# Patient Record
Sex: FEMALE | Race: WHITE | NOT HISPANIC OR LATINO | Employment: FULL TIME | URBAN - METROPOLITAN AREA
[De-identification: names, ages, dates, MRNs, and addresses within clinical notes are randomized per-mention and may not be internally consistent; named-entity substitution may affect disease eponyms.]

---

## 2023-09-29 ENCOUNTER — APPOINTMENT (OUTPATIENT)
Dept: PRIMARY CARE | Facility: CLINIC | Age: 48
End: 2023-09-29
Payer: COMMERCIAL

## 2023-10-05 ENCOUNTER — TELEMEDICINE (OUTPATIENT)
Dept: PRIMARY CARE | Facility: CLINIC | Age: 48
End: 2023-10-05
Payer: COMMERCIAL

## 2023-10-05 DIAGNOSIS — E88.810 ABDOMINAL OBESITY AND METABOLIC SYNDROME: ICD-10-CM

## 2023-10-05 DIAGNOSIS — K21.9 GASTROESOPHAGEAL REFLUX DISEASE, UNSPECIFIED WHETHER ESOPHAGITIS PRESENT: ICD-10-CM

## 2023-10-05 DIAGNOSIS — I10 HYPERTENSION, UNSPECIFIED TYPE: ICD-10-CM

## 2023-10-05 DIAGNOSIS — M54.50 LOW BACK PAIN, UNSPECIFIED BACK PAIN LATERALITY, UNSPECIFIED CHRONICITY, UNSPECIFIED WHETHER SCIATICA PRESENT: ICD-10-CM

## 2023-10-05 DIAGNOSIS — E66.9 ABDOMINAL OBESITY AND METABOLIC SYNDROME: ICD-10-CM

## 2023-10-05 DIAGNOSIS — M79.7 FIBROMYALGIA: ICD-10-CM

## 2023-10-05 DIAGNOSIS — Z00.00 HEALTHCARE MAINTENANCE: ICD-10-CM

## 2023-10-05 DIAGNOSIS — F32.A DEPRESSION, UNSPECIFIED DEPRESSION TYPE: ICD-10-CM

## 2023-10-05 DIAGNOSIS — E78.5 HYPERLIPIDEMIA, UNSPECIFIED HYPERLIPIDEMIA TYPE: ICD-10-CM

## 2023-10-05 DIAGNOSIS — B00.9 HERPES: Primary | ICD-10-CM

## 2023-10-05 PROBLEM — L30.9 ECZEMA: Status: ACTIVE | Noted: 2023-10-05

## 2023-10-05 PROBLEM — H52.203 MYOPIA OF BOTH EYES WITH ASTIGMATISM AND PRESBYOPIA: Status: ACTIVE | Noted: 2023-10-05

## 2023-10-05 PROBLEM — E66.01 OBESITY, MORBID (MORE THAN 100 LBS OVER IDEAL WEIGHT OR BMI > 40) (MULTI): Status: ACTIVE | Noted: 2023-10-05

## 2023-10-05 PROBLEM — G47.19 EXCESSIVE DAYTIME SLEEPINESS: Status: ACTIVE | Noted: 2023-10-05

## 2023-10-05 PROBLEM — E88.819 INSULIN RESISTANCE: Status: ACTIVE | Noted: 2023-10-05

## 2023-10-05 PROBLEM — L24.A9 WOUND DRAINAGE: Status: RESOLVED | Noted: 2023-10-05 | Resolved: 2023-10-05

## 2023-10-05 PROBLEM — R74.8 ELEVATED ALKALINE PHOSPHATASE LEVEL: Status: ACTIVE | Noted: 2023-10-05

## 2023-10-05 PROBLEM — M48.07 STENOSIS OF LUMBOSACRAL SPINE: Status: ACTIVE | Noted: 2023-10-05

## 2023-10-05 PROBLEM — S73.199A ACETABULAR LABRUM TEAR: Status: RESOLVED | Noted: 2023-10-05 | Resolved: 2023-10-05

## 2023-10-05 PROBLEM — H16.223 KERATOCONJUNCTIVITIS SICCA OF BOTH EYES NOT DUE TO SJOGREN'S SYNDROME: Status: ACTIVE | Noted: 2023-10-05

## 2023-10-05 PROBLEM — E55.9 VITAMIN D DEFICIENCY: Status: ACTIVE | Noted: 2023-10-05

## 2023-10-05 PROBLEM — R92.8 ABNORMAL MAMMOGRAM: Status: ACTIVE | Noted: 2023-10-05

## 2023-10-05 PROBLEM — S43.431A SLAP LESION OF RIGHT SHOULDER: Status: ACTIVE | Noted: 2023-10-05

## 2023-10-05 PROBLEM — G47.33 OBSTRUCTIVE SLEEP APNEA SYNDROME IN ADULT: Status: ACTIVE | Noted: 2023-10-05

## 2023-10-05 PROBLEM — G47.11 IDIOPATHIC HYPERSOMNOLENCE: Status: ACTIVE | Noted: 2023-10-05

## 2023-10-05 PROBLEM — M25.561 RIGHT KNEE PAIN: Status: ACTIVE | Noted: 2023-10-05

## 2023-10-05 PROBLEM — R63.2 POLYPHAGIA: Status: ACTIVE | Noted: 2023-10-05

## 2023-10-05 PROBLEM — M43.10 ACQUIRED SPONDYLOLISTHESIS: Status: ACTIVE | Noted: 2023-10-05

## 2023-10-05 PROBLEM — G47.10 HYPERSOMNOLENCE: Status: ACTIVE | Noted: 2023-10-05

## 2023-10-05 PROBLEM — N80.9 ENDOMETRIOSIS: Status: ACTIVE | Noted: 2023-10-05

## 2023-10-05 PROBLEM — M65.4 DE QUERVAIN'S TENOSYNOVITIS, RIGHT: Status: ACTIVE | Noted: 2023-10-05

## 2023-10-05 PROBLEM — M25.50 JOINT PAIN: Status: ACTIVE | Noted: 2023-10-05

## 2023-10-05 PROBLEM — N95.1 MENOPAUSAL SYMPTOMS: Status: ACTIVE | Noted: 2023-10-05

## 2023-10-05 PROBLEM — R10.2 PELVIC PAIN IN FEMALE: Status: ACTIVE | Noted: 2023-10-05

## 2023-10-05 PROBLEM — M25.531 RIGHT WRIST PAIN: Status: ACTIVE | Noted: 2023-10-05

## 2023-10-05 PROBLEM — R06.00 DYSPNEA, UNSPECIFIED: Status: ACTIVE | Noted: 2023-10-05

## 2023-10-05 PROBLEM — D68.52 PROTHROMBIN GENE MUTATION (MULTI): Status: RESOLVED | Noted: 2023-10-05 | Resolved: 2023-10-05

## 2023-10-05 PROBLEM — R00.0 TACHYCARDIA: Status: ACTIVE | Noted: 2023-10-05

## 2023-10-05 PROBLEM — R00.2 PALPITATIONS: Status: ACTIVE | Noted: 2023-10-05

## 2023-10-05 PROBLEM — L02.92 RECURRENT BOILS: Status: ACTIVE | Noted: 2023-10-05

## 2023-10-05 PROBLEM — M47.817 SPONDYLOSIS WITHOUT MYELOPATHY OR RADICULOPATHY, LUMBOSACRAL REGION: Status: ACTIVE | Noted: 2023-10-05

## 2023-10-05 PROBLEM — R73.03 PREDIABETES: Status: ACTIVE | Noted: 2023-10-05

## 2023-10-05 PROBLEM — H52.13 MYOPIA OF BOTH EYES WITH ASTIGMATISM AND PRESBYOPIA: Status: ACTIVE | Noted: 2023-10-05

## 2023-10-05 PROBLEM — B00.1 RECURRENT COLD SORES: Status: ACTIVE | Noted: 2023-10-05

## 2023-10-05 PROBLEM — R53.83 FATIGUE: Status: ACTIVE | Noted: 2023-10-05

## 2023-10-05 PROBLEM — H52.4 MYOPIA OF BOTH EYES WITH ASTIGMATISM AND PRESBYOPIA: Status: ACTIVE | Noted: 2023-10-05

## 2023-10-05 PROBLEM — D68.52 PROTHROMBIN GENE MUTATION (MULTI): Status: ACTIVE | Noted: 2023-10-05

## 2023-10-05 PROCEDURE — 99214 OFFICE O/P EST MOD 30 MIN: CPT | Performed by: STUDENT IN AN ORGANIZED HEALTH CARE EDUCATION/TRAINING PROGRAM

## 2023-10-05 RX ORDER — OMEPRAZOLE 20 MG/1
CAPSULE, DELAYED RELEASE ORAL
COMMUNITY
End: 2023-12-05 | Stop reason: SDUPTHER

## 2023-10-05 RX ORDER — CELECOXIB 200 MG/1
200 CAPSULE ORAL 2 TIMES DAILY
Qty: 60 CAPSULE | Refills: 5 | Status: SHIPPED | OUTPATIENT
Start: 2023-10-05 | End: 2024-04-05 | Stop reason: SDUPTHER

## 2023-10-05 RX ORDER — VALACYCLOVIR HYDROCHLORIDE 500 MG/1
500 TABLET, FILM COATED ORAL DAILY
Qty: 90 TABLET | Refills: 1 | Status: SHIPPED | OUTPATIENT
Start: 2023-10-05 | End: 2024-04-05 | Stop reason: SDUPTHER

## 2023-10-05 RX ORDER — HYDROCHLOROTHIAZIDE 12.5 MG/1
1 TABLET ORAL
COMMUNITY
Start: 2021-05-21 | End: 2023-10-05 | Stop reason: ALTCHOICE

## 2023-10-05 RX ORDER — DULOXETIN HYDROCHLORIDE 60 MG/1
60 CAPSULE, DELAYED RELEASE ORAL DAILY
Qty: 90 CAPSULE | Refills: 1 | Status: SHIPPED | OUTPATIENT
Start: 2023-10-05 | End: 2024-04-05 | Stop reason: SDUPTHER

## 2023-10-05 RX ORDER — ROSUVASTATIN CALCIUM 10 MG/1
10 TABLET, COATED ORAL EVERY 24 HOURS
Qty: 90 TABLET | Refills: 1 | Status: SHIPPED | OUTPATIENT
Start: 2023-10-05 | End: 2024-04-05 | Stop reason: SDUPTHER

## 2023-10-05 RX ORDER — METHOCARBAMOL 500 MG/1
TABLET, FILM COATED ORAL
COMMUNITY
Start: 2023-03-08 | End: 2023-11-10 | Stop reason: WASHOUT

## 2023-10-05 RX ORDER — ROSUVASTATIN CALCIUM 10 MG/1
TABLET, COATED ORAL EVERY 24 HOURS
COMMUNITY
Start: 2017-06-13 | End: 2023-10-05 | Stop reason: SDUPTHER

## 2023-10-05 RX ORDER — GABAPENTIN 100 MG/1
1 CAPSULE ORAL
COMMUNITY
Start: 2023-06-06 | End: 2023-12-05 | Stop reason: SDUPTHER

## 2023-10-05 NOTE — PROGRESS NOTES
Subjective   Patient ID: Fani Garcia is a 48 y.o. female who presents for Hyperlipidemia, GERD, Depression, and Endometriosis (/).  Today she is accompanied by alone.     HPI  1. 6-month follow-up/medication refills  Overall she is doing well  Requests a virtual visit due to her schedule and now working at the [facility] [employer] within the [facility]     2. Weight management  Now 171 Ibs, 90+ Ib weight loss  Current weight-loss management plan includes medical therapy of Ozempic, well-tolerated  Follows-up with Dr. Briana Figueroa (Bariatric Surgery)     3. Depression/Fibromyalgia  Well-controlled with current medication regiment of Duloxetine 60 mg oral tabs daily  Denies of any recent depression or generalized pain symptoms     4. GERD  Currently on Omeprazole 20 mg tabs daily  Overall feels stable  Gets refills from her weight loss specialty providers     5. Hyperlipidemia  Currently managed with medication regiment of Rosuvastatin 10 mg oral tabs taken at night  Denies of any recent myalgias or cardiopulmonary symptoms  Willing to get blood work in the near future     6. Hypertension  Managed OFF anti-hypertensive medication regiment  States that, due to the recent weight loss, has discontinued her anti-hypertensive medication regiment  Measures blood-pressure at home with home BP cuff  Denies any cardiopulmonary symptoms     7. Herpes  Manages breakthrough symptoms with medication regiment of Valacyclovir 500 mg oral tabs  Requesting medication refill  Not currently experiencing a herpetic oral flare  Denies of any recent  symptoms     8. Lower back pain  Follows-up with Dr. Bony Cruz (Orthopedic Surgeon), most recently seen last month  She is now status post surgery from earlier this year  Unfortunately some increase signs/symptoms occurred and she did use gabapentin which helped  Otherwise, starting physical therapy in the near future      Current Outpatient Medications on File Prior  to Visit   Medication Sig Dispense Refill    gabapentin (Neurontin) 100 mg capsule 1 capsule (100 mg).      methocarbamol (Robaxin) 500 mg tablet       rosuvastatin (Crestor) 10 mg tablet once every 24 hours.      [DISCONTINUED] hydroCHLOROthiazide (HYDRODiuril) 12.5 mg tablet Take 1 tablet (12.5 mg) by mouth once daily in the morning. Take before meals.      norethindrone (Aygestin) 5 mg tablet TAKE 1 TABLET BY MOUTH ONCE DAILY 90 tablet 3    omeprazole (PriLOSEC) 20 mg DR capsule TAKE 1 CAPSULE BY MOUTH EVERY DAY 30 MINUTES BEFORE MORNING MEAL FOR 90 DAYS      semaglutide 2 mg/dose (8 mg/3 mL) pen injector INJECT 2MG UNDER THE SKIN ONCE WEEKLY 9 mL 3     No current facility-administered medications on file prior to visit.        Allergies   Allergen Reactions    Lisinopril Nausea And Vomiting and Unknown    Meperidine Hives, Rash and Unknown       Immunization History   Administered Date(s) Administered    Flu vaccine (IIV4), preservative free *Check age/dose* 10/28/2016    Moderna SARS-CoV-2 Vaccination 12/31/2020, 02/19/2021, 12/17/2021    Tdap vaccine, age 7 year and older (BOOSTRIX) 07/24/2020         Review of Systems  All pertinent positive symptoms are included in the history of present illness.  All other systems have been reviewed and are negative and noncontributory to this patient's current ailments.     Objective   There were no vitals taken for this visit.  BSA: There is no height or weight on file to calculate BSA.      Physical Exam  CONSTITUTIONAL - well nourished, well developed, looks like stated age, in no acute distress, not ill-appearing, and not tired appearing  SKIN - normal skin color and pigmentation, normal skin turgor without rash, lesions, or nodules visualized  HEAD - no trauma, normocephalic  EYES - normal external exam  CHEST -no distressed breathing, good effort  EXTREMITIES - no edema, no deformities  NEUROLOGICAL - normal balance, normal motor, no ataxia  PSYCHIATRIC - alert,  pleasant and cordial, age-appropriate      Assessment/Plan   1. 6-month follow-up/medication refills  Patient is doing well overall and her medications are well-tolerated  Fasting lab work ordered to be done at her earliest convenience     2. Weight management  I am pleased to hear of your successful weight-loss, currently 171 Ibs, 90+ Ib weight loss  Please continue your current medication regiment as it is well-tolerated and performing appropriately  Continue close follow-up with Dr. Briana Figueroa (Bariatric Surgery) and the weight-loss clinic.     3. Depression/Fibromyalgia  Please continue Duloxetine 60 mg oral tabs daily  No changes recommended at this time   Stable     4. GERD  Continue Omeprazole 20 mg tabs daily for symptom management.     5. Hyperlipidemia  Continue Rosuvastatin 10 mg oral tabs  Lab work ordered so please do this at your earliest convenience     6. Hypertension  We discussed your personal decision to discontinue your current anti-hypertensive medication regiment.  Informed of recent normotensive home blood-pressure measurements associated with recent weight-loss  Please continue measuring your blood-pressures at home  Blood-pressure goal of 130/80, ideally 120/80     7. Herpes  Manages breakthrough symptoms with medication regiment of Valacyclovir 500 mg oral tabs  Requesting medication refill  Not currently experiencing a herpetic oral flare  Denies of any recent  symptoms     8. Lower back pain  Follows-up with Dr. Bony Cruz (Orthopedic Surgeon)  Please continue following up with him per his protocol     Please follow-up for your yearly physical examination in 6 months for continued care or on an as-needed basis

## 2023-10-18 RX ORDER — SODIUM FLUORIDE 6 MG/ML
PASTE, DENTIFRICE DENTAL
COMMUNITY

## 2023-10-18 RX ORDER — CHOLECALCIFEROL (VITAMIN D3) 50 MCG
50 TABLET ORAL DAILY
COMMUNITY

## 2023-10-18 RX ORDER — PHENTERMINE AND TOPIRAMATE 11.25; 69 MG/1; MG/1
1 CAPSULE, EXTENDED RELEASE ORAL DAILY
COMMUNITY
End: 2023-12-05 | Stop reason: ALTCHOICE

## 2023-10-19 ENCOUNTER — CLINICAL SUPPORT (OUTPATIENT)
Dept: PHYSICAL THERAPY | Facility: CLINIC | Age: 48
End: 2023-10-19
Payer: COMMERCIAL

## 2023-10-19 ASSESSMENT — ENCOUNTER SYMPTOMS
LOSS OF SENSATION IN FEET: 0
OCCASIONAL FEELINGS OF UNSTEADINESS: 0
DEPRESSION: 0

## 2023-10-20 ENCOUNTER — APPOINTMENT (OUTPATIENT)
Dept: SLEEP MEDICINE | Facility: CLINIC | Age: 48
End: 2023-10-20
Payer: COMMERCIAL

## 2023-11-02 ENCOUNTER — APPOINTMENT (OUTPATIENT)
Dept: PHYSICAL THERAPY | Facility: CLINIC | Age: 48
End: 2023-11-02
Payer: COMMERCIAL

## 2023-11-10 ENCOUNTER — OFFICE VISIT (OUTPATIENT)
Dept: PRIMARY CARE | Facility: CLINIC | Age: 48
End: 2023-11-10
Payer: COMMERCIAL

## 2023-11-10 ENCOUNTER — EVALUATION (OUTPATIENT)
Dept: PHYSICAL THERAPY | Facility: CLINIC | Age: 48
End: 2023-11-10
Payer: COMMERCIAL

## 2023-11-10 DIAGNOSIS — M54.50 CHRONIC LOW BACK PAIN, UNSPECIFIED BACK PAIN LATERALITY, UNSPECIFIED WHETHER SCIATICA PRESENT: Primary | ICD-10-CM

## 2023-11-10 DIAGNOSIS — Z98.1 S/P LUMBAR FUSION: ICD-10-CM

## 2023-11-10 DIAGNOSIS — Z23 ENCOUNTER FOR IMMUNIZATION: Primary | ICD-10-CM

## 2023-11-10 DIAGNOSIS — G89.29 CHRONIC LOW BACK PAIN, UNSPECIFIED BACK PAIN LATERALITY, UNSPECIFIED WHETHER SCIATICA PRESENT: Primary | ICD-10-CM

## 2023-11-10 PROCEDURE — 3008F BODY MASS INDEX DOCD: CPT | Performed by: STUDENT IN AN ORGANIZED HEALTH CARE EDUCATION/TRAINING PROGRAM

## 2023-11-10 PROCEDURE — 97110 THERAPEUTIC EXERCISES: CPT | Mod: GP | Performed by: PHYSICAL THERAPIST

## 2023-11-10 PROCEDURE — 90471 IMMUNIZATION ADMIN: CPT | Performed by: STUDENT IN AN ORGANIZED HEALTH CARE EDUCATION/TRAINING PROGRAM

## 2023-11-10 PROCEDURE — 1036F TOBACCO NON-USER: CPT | Performed by: STUDENT IN AN ORGANIZED HEALTH CARE EDUCATION/TRAINING PROGRAM

## 2023-11-10 PROCEDURE — 97161 PT EVAL LOW COMPLEX 20 MIN: CPT | Mod: GP | Performed by: PHYSICAL THERAPIST

## 2023-11-10 PROCEDURE — 90686 IIV4 VACC NO PRSV 0.5 ML IM: CPT | Performed by: STUDENT IN AN ORGANIZED HEALTH CARE EDUCATION/TRAINING PROGRAM

## 2023-11-10 ASSESSMENT — ENCOUNTER SYMPTOMS
DEPRESSION: 0
OCCASIONAL FEELINGS OF UNSTEADINESS: 0
LOSS OF SENSATION IN FEET: 0

## 2023-11-10 NOTE — PROGRESS NOTES
"Physical Therapy Evaluation    Patient Name: Fani Garcia  MRN: [id number]  Today's Date: 11/10/2023  Visit: 1  Referred by:  Dr. Cruz/Nehal  Diagnosis:   1. Chronic low back pain, unspecified back pain laterality, unspecified whether sciatica present        2. S/P lumbar fusion          SUBJECTIVE:  48 y.o. english speaking male s/p L4-5 fusion 2/21/23 by Dr. Cruz.  She reports ~6 months of being pain free and then in July the symptoms started again insidiously.   Worse with prolonged standing and walking  Better: lying down, sitting   She reports that her back is very stiff in AM and she can't straighten up fully.  Pain located sacral 0-8/10.  Also numbness of sole of left foot.  This symptom was present prior to surgery.    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  OBJECTIVE:  Increased tension in left SLR with 60* ROM compared to R) 70.  (-) CONRAD bilaterally.  5/5 LE myotome strength  4/5 L) hip ABD and EXT  Lower abdominal strength 3+/5  ROM of LB is full except for mild limit with extension.  End range pain and pressure with extension, better with reps.  All other movements full with end range pull same with reps.  (-) Standing flexion test, (-) stork test  (-) SIJ provocation tests except for L) thigh thrust.  1/7.  Outcome Measure:  Oswestry- 38%    ASSESSMENT:  Pt. s/p laminectomy and fusion L4-5 with return of symptoms.  She has some core muscle weakness, pain, and limited functional tolerances secondary to pain.  She requires PT to address these issues to improve her functional tolerances.    TREATMENT:  - Therex:  Supine LE neural flossing 2x10  Prone on elbows x 2 mins.  Seated trunk flexion x 60\"  HL Tband hip ABD 5\" x 10 x 2  Prone over pillow Tband hip EXT G x 10 ea.  Abdominal brace with ball squeeze 10\" x10  Dead bug #1 x 5 x 2    PATIENT EDUCATION: HEP    PLAN:   Daily HEP  Weekly PT x 8 weeks  Rehab potential: good  Plan of care agreement: " Y    GOALS:  Active       PT Problem       Pt. will have improved score on Oswestry by at least 15%       Start:  11/10/23    Expected End:  01/12/24            Pt. will have improved core and LE strength to assist with improving functional tolerances       Start:  11/10/23    Expected End:  01/12/24            Pt. will be independent with HEP       Start:  11/10/23    Expected End:  01/12/24            Pt. will have decreased reports of pain by at least 2 levels using a VAS       Start:  11/10/23    Expected End:  01/12/24

## 2023-11-10 NOTE — Clinical Note
November 10, 2023     Patient: Fani Garcia   YOB: 1975   Date of Visit: 11/10/2023       To Whom It May Concern:    It is my medical opinion that Fani Garcia {Work release (duty restriction):38539}.    If you have any questions or concerns, please don't hesitate to call.         Sincerely,        Corey Olvera, PT    CC: No Recipients

## 2023-11-10 NOTE — Clinical Note
November 10, 2023     Patient: Fani Garcia   YOB: 1975   Date of Visit: 11/10/2023       To Whom it May Concern:    Fani Garcia was seen in my clinic on 11/10/2023. She {Return to school/sport:12279}.    If you have any questions or concerns, please don't hesitate to call.         Sincerely,          Corey Olvera, PT        CC: No Recipients

## 2023-11-14 ENCOUNTER — ANCILLARY PROCEDURE (OUTPATIENT)
Dept: RADIOLOGY | Facility: CLINIC | Age: 48
End: 2023-11-14
Payer: COMMERCIAL

## 2023-11-14 ENCOUNTER — OFFICE VISIT (OUTPATIENT)
Dept: PAIN MEDICINE | Facility: CLINIC | Age: 48
End: 2023-11-14
Payer: COMMERCIAL

## 2023-11-14 VITALS
SYSTOLIC BLOOD PRESSURE: 126 MMHG | WEIGHT: 171 LBS | DIASTOLIC BLOOD PRESSURE: 76 MMHG | HEIGHT: 64 IN | BODY MASS INDEX: 29.19 KG/M2 | RESPIRATION RATE: 16 BRPM

## 2023-11-14 DIAGNOSIS — M54.16 LUMBAR RADICULOPATHY: Primary | ICD-10-CM

## 2023-11-14 DIAGNOSIS — Z98.1 S/P LUMBAR FUSION: ICD-10-CM

## 2023-11-14 DIAGNOSIS — M54.16 LUMBAR RADICULOPATHY: ICD-10-CM

## 2023-11-14 DIAGNOSIS — M70.61 TROCHANTERIC BURSITIS OF RIGHT HIP: ICD-10-CM

## 2023-11-14 PROCEDURE — 3008F BODY MASS INDEX DOCD: CPT | Performed by: ANESTHESIOLOGY

## 2023-11-14 PROCEDURE — 2500000004 HC RX 250 GENERAL PHARMACY W/ HCPCS (ALT 636 FOR OP/ED): Performed by: ANESTHESIOLOGY

## 2023-11-14 PROCEDURE — 20610 DRAIN/INJ JOINT/BURSA W/O US: CPT | Performed by: ANESTHESIOLOGY

## 2023-11-14 PROCEDURE — 2500000005 HC RX 250 GENERAL PHARMACY W/O HCPCS: Performed by: ANESTHESIOLOGY

## 2023-11-14 PROCEDURE — 99214 OFFICE O/P EST MOD 30 MIN: CPT | Performed by: ANESTHESIOLOGY

## 2023-11-14 PROCEDURE — 72120 X-RAY BEND ONLY L-S SPINE: CPT | Mod: FY

## 2023-11-14 PROCEDURE — 1036F TOBACCO NON-USER: CPT | Performed by: ANESTHESIOLOGY

## 2023-11-14 RX ORDER — TRIAMCINOLONE ACETONIDE 40 MG/ML
40 INJECTION, SUSPENSION INTRA-ARTICULAR; INTRAMUSCULAR ONCE
Status: COMPLETED | OUTPATIENT
Start: 2023-11-14 | End: 2023-11-14

## 2023-11-14 RX ORDER — LIDOCAINE HYDROCHLORIDE 10 MG/ML
4 INJECTION, SOLUTION EPIDURAL; INFILTRATION; INTRACAUDAL; PERINEURAL ONCE
Status: COMPLETED | OUTPATIENT
Start: 2023-11-14 | End: 2023-11-14

## 2023-11-14 RX ADMIN — TRIAMCINOLONE ACETONIDE 40 MG: 40 INJECTION, SUSPENSION INTRA-ARTICULAR; INTRAMUSCULAR at 16:30

## 2023-11-14 RX ADMIN — LIDOCAINE HYDROCHLORIDE 40 MG: 10 INJECTION, SOLUTION EPIDURAL; INFILTRATION; INTRACAUDAL; PERINEURAL at 16:31

## 2023-11-14 ASSESSMENT — ENCOUNTER SYMPTOMS
HEMATOLOGIC/LYMPHATIC NEGATIVE: 1
CARDIOVASCULAR NEGATIVE: 1
GASTROINTESTINAL NEGATIVE: 1
CONSTITUTIONAL NEGATIVE: 1
PSYCHIATRIC NEGATIVE: 1
ENDOCRINE NEGATIVE: 1
RESPIRATORY NEGATIVE: 1
BACK PAIN: 1
EYES NEGATIVE: 1
ALLERGIC/IMMUNOLOGIC NEGATIVE: 1

## 2023-11-14 ASSESSMENT — PATIENT HEALTH QUESTIONNAIRE - PHQ9
2. FEELING DOWN, DEPRESSED OR HOPELESS: NOT AT ALL
1. LITTLE INTEREST OR PLEASURE IN DOING THINGS: NOT AT ALL
SUM OF ALL RESPONSES TO PHQ9 QUESTIONS 1 AND 2: 0

## 2023-11-14 ASSESSMENT — PAIN - FUNCTIONAL ASSESSMENT: PAIN_FUNCTIONAL_ASSESSMENT: 0-10

## 2023-11-14 ASSESSMENT — LIFESTYLE VARIABLES: TOTAL SCORE: 6

## 2023-11-14 ASSESSMENT — PAIN DESCRIPTION - DESCRIPTORS: DESCRIPTORS: NUMBNESS;TINGLING

## 2023-11-14 ASSESSMENT — PAIN SCALES - GENERAL
PAINLEVEL: 8
PAINLEVEL_OUTOF10: 8

## 2023-11-14 NOTE — PROGRESS NOTES
Subjective   Patient ID: Fani Garcia is a 48 y.o. female who presents for Back Pain and right hip pain.  Back Pain  Associated symptoms include numbness.     Patient here today for follow up today.  She had L4/5 fusion with Dr. Cruz 2/2023.  She has had a 90 lb weight loss over the last 12 months but 15 pounds since surgery.  She was doing great until July.  She had no trauma.  She started having back pain first.  The pain then started radiating down to the left leg to the top of her foot in the same manner as before her surgery.  Standing is the worse position for her.  She is unable to stand for longer than 10 min and this is how she was before.  She has to lean forward to wash dishes and she has a hard sheree lifting.  She was sent back to PT and started on Friday and has had some elevated pain.  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  She is working on core and glute strengthening.      The patient reports that she has been having right greater trochanter bursitis.  She knows that since has been in physical therapy and trying to walk more and her gait being off because of her left radicular leg pain she has been getting point tenderness over the right greater trochanter.  It bothers her when she rolls over at night and will be painful for her she gets it with walking as well and sitting and crossing her legs.  She would like an injection today.  Review of Systems   Constitutional: Negative.    HENT: Negative.     Eyes: Negative.    Respiratory: Negative.     Cardiovascular: Negative.    Gastrointestinal: Negative.    Endocrine: Negative.    Genitourinary: Negative.    Musculoskeletal:  Positive for arthralgias, back pain and myalgias.   Skin: Negative.    Allergic/Immunologic: Negative.    Neurological:  Positive for numbness.   Hematological: Negative.    Psychiatric/Behavioral: Negative.         Objective   Physical Exam  Vitals and nursing note reviewed.   Constitutional:       Appearance: Normal  appearance.   HENT:      Head: Normocephalic and atraumatic.      Right Ear: Ear canal and external ear normal.      Left Ear: Ear canal and external ear normal.      Nose: Nose normal.      Mouth/Throat:      Mouth: Mucous membranes are moist.      Pharynx: Oropharynx is clear.   Eyes:      Conjunctiva/sclera: Conjunctivae normal.      Pupils: Pupils are equal, round, and reactive to light.   Cardiovascular:      Rate and Rhythm: Normal rate.   Pulmonary:      Effort: Pulmonary effort is normal. No respiratory distress.   Musculoskeletal:      Cervical back: Normal range of motion and neck supple.      Lumbar back: Tenderness present. Decreased range of motion. Negative right straight leg raise test and negative left straight leg raise test.        Back:       Right hip: Tenderness (right GT) present.        Legs:    Skin:     General: Skin is warm and dry.   Neurological:      Mental Status: She is alert and oriented to person, place, and time.      Motor: Motor function is intact.      Coordination: Coordination is intact.      Gait: Gait is intact.      Deep Tendon Reflexes:      Reflex Scores:       Patellar reflexes are 2+ on the right side and 2+ on the left side.  Psychiatric:         Mood and Affect: Mood normal.         Thought Content: Thought content normal.     Joint Injection/Aspiration    Date/Time: 11/16/2023 7:33 AM    Performed by: Leander Pompa MD  Authorized by: Leander Pompa MD    Consent:     Consent obtained:  Written    Consent given by:  Patient    Risks, benefits, and alternatives were discussed: yes      Risks discussed:  Pain    Alternatives discussed:  No treatment  Universal protocol:     Procedure explained and questions answered to patient or proxy's satisfaction: yes      Relevant documents present and verified: yes      Test results available: yes      Imaging studies available: yes      Required blood products, implants, devices, and special equipment available: yes       Site/side marked: yes      Immediately prior to procedure, a time out was called: yes      Patient identity confirmed:  Verbally with patient and provided demographic data  Location:     Location:  Hip    Hip:  R hip joint (Greater Trochanter Bursa)  Procedure details:     Needle gauge: 25 G.    Ultrasound guidance: no      Approach:  Lateral    Steroid injected: yes    Post-procedure details:     Dressing:  Adhesive bandage    Procedure completion:  Tolerated well, no immediate complications  Comments:      PROCEDURE:  Greater Trochanteric Bursa Injection     The patient was identified in the preoperative holding area.  The procedure was discussed in detail including risks, benefits, and alternatives.  The patient wished to proceed.      PROCEDURE DETAILS:  The patient was taken to the procedure room and placed on the procedure table in the right lateral decubitus position.  Following this, the skin was marked and the right hip was prepared with a chloraprep scrub. The area over the right greater trochanter was palpated for the area of maximal tenderness and marked onto the skin. After, a 25 gauge spinal needle was inserted into the skin until the needle tip touched os.  The needle was aspirated for heme, which was negative. Then a mixture of 4 mL of 1% lidocaine and 40mg of triamcinolone was injected into the[] trochanteric bursa, 0 ml was wasted. The needle was removed and bandage applied    Assessment/Plan   Problem List Items Addressed This Visit             ICD-10-CM       Neuro    S/P lumbar fusion Z98.1    Relevant Orders    XR lumbar spine 4+ views w flexion extension    CT lumbar spine wo IV contrast     Other Visit Diagnoses         Codes    Lumbar radiculopathy    -  Primary M54.16    Relevant Orders    XR lumbar spine 4+ views w flexion extension    CT lumbar spine wo IV contrast          I nice discussion with the patient today our plan will be as follows.    Radiology: Patient have new  flexion-extension films of her lumbar spine as well as CT scan.    Physically: Patient should continue in physical therapy.    Psychologically: No issues at this time.    Medication: Nothing at this time.    Duration: 4 months.    Intervention: We will get the patient set up for caudal epidural steroid injection under fluoroscopic guidance to help with acute onset radicular leg pain after lumbar surgery.  Risks, benefit, and alternatives of the procedure were discussed with the patient.  Oswestry score has been compelted and recorded.    Patient have right greater trochanter injection today.  Risks, benefit, and alternatives of the procedure were discussed with the patient.  Oswestry score has been compelted and recorded.

## 2023-11-16 PROCEDURE — 20610 DRAIN/INJ JOINT/BURSA W/O US: CPT | Performed by: ANESTHESIOLOGY

## 2023-11-16 ASSESSMENT — ENCOUNTER SYMPTOMS
ARTHRALGIAS: 1
MYALGIAS: 1
NUMBNESS: 1

## 2023-12-05 ENCOUNTER — TELEMEDICINE (OUTPATIENT)
Dept: PAIN MEDICINE | Facility: CLINIC | Age: 48
End: 2023-12-05
Payer: COMMERCIAL

## 2023-12-05 VITALS — HEIGHT: 64 IN | WEIGHT: 160 LBS | BODY MASS INDEX: 27.31 KG/M2

## 2023-12-05 DIAGNOSIS — E88.819 INSULIN RESISTANCE: ICD-10-CM

## 2023-12-05 DIAGNOSIS — M48.07 STENOSIS OF LUMBOSACRAL SPINE: ICD-10-CM

## 2023-12-05 DIAGNOSIS — E66.9 ABDOMINAL OBESITY AND METABOLIC SYNDROME: ICD-10-CM

## 2023-12-05 DIAGNOSIS — E88.810 ABDOMINAL OBESITY AND METABOLIC SYNDROME: ICD-10-CM

## 2023-12-05 DIAGNOSIS — E66.3 OVERWEIGHT (BMI 25.0-29.9): ICD-10-CM

## 2023-12-05 DIAGNOSIS — Z98.1 S/P LUMBAR FUSION: ICD-10-CM

## 2023-12-05 DIAGNOSIS — K21.9 GASTROESOPHAGEAL REFLUX DISEASE, UNSPECIFIED WHETHER ESOPHAGITIS PRESENT: ICD-10-CM

## 2023-12-05 DIAGNOSIS — R63.2 POLYPHAGIA: ICD-10-CM

## 2023-12-05 DIAGNOSIS — M54.16 LUMBAR RADICULOPATHY: ICD-10-CM

## 2023-12-05 DIAGNOSIS — R73.03 PREDIABETES: Primary | ICD-10-CM

## 2023-12-05 DIAGNOSIS — E88.810 METABOLIC SYNDROME: ICD-10-CM

## 2023-12-05 PROCEDURE — 99214 OFFICE O/P EST MOD 30 MIN: CPT | Performed by: NURSE PRACTITIONER

## 2023-12-05 PROCEDURE — RXMED WILLOW AMBULATORY MEDICATION CHARGE

## 2023-12-05 PROCEDURE — 99401 PREV MED CNSL INDIV APPRX 15: CPT | Performed by: NURSE PRACTITIONER

## 2023-12-05 RX ORDER — OXYCODONE AND ACETAMINOPHEN 5; 325 MG/1; MG/1
1 TABLET ORAL EVERY 6 HOURS PRN
Qty: 20 TABLET | Refills: 0 | Status: SHIPPED | OUTPATIENT
Start: 2023-12-05 | End: 2023-12-08 | Stop reason: ALTCHOICE

## 2023-12-05 RX ORDER — GABAPENTIN 100 MG/1
100 CAPSULE ORAL 3 TIMES DAILY
Qty: 270 CAPSULE | Refills: 2 | Status: SHIPPED | OUTPATIENT
Start: 2023-12-05 | End: 2024-03-04

## 2023-12-05 RX ORDER — PHENTERMINE AND TOPIRAMATE 15; 92 MG/1; MG/1
15-92 CAPSULE, EXTENDED RELEASE ORAL DAILY
Qty: 30 CAPSULE | Refills: 3 | Status: SHIPPED | OUTPATIENT
Start: 2023-12-05 | End: 2024-05-01 | Stop reason: SDUPTHER

## 2023-12-05 RX ORDER — OMEPRAZOLE 20 MG/1
CAPSULE, DELAYED RELEASE ORAL
Qty: 90 CAPSULE | Refills: 1 | Status: SHIPPED | OUTPATIENT
Start: 2023-12-05

## 2023-12-05 ASSESSMENT — PAIN SCALES - GENERAL: PAINLEVEL_OUTOF10: 3

## 2023-12-05 ASSESSMENT — PATIENT HEALTH QUESTIONNAIRE - PHQ9
SUM OF ALL RESPONSES TO PHQ9 QUESTIONS 1 AND 2: 0
2. FEELING DOWN, DEPRESSED OR HOPELESS: NOT AT ALL
1. LITTLE INTEREST OR PLEASURE IN DOING THINGS: NOT AT ALL

## 2023-12-05 ASSESSMENT — PAIN DESCRIPTION - DESCRIPTORS: DESCRIPTORS: ACHING

## 2023-12-05 ASSESSMENT — PAIN - FUNCTIONAL ASSESSMENT: PAIN_FUNCTIONAL_ASSESSMENT: 0-10

## 2023-12-05 NOTE — PROGRESS NOTES
Subjective   Patient ID: Fani Garcia is a 48 y.o. female who presents for Pain Management and Weight Management Follow-ups/Medication Refills.     With the patient's permission, a telehealth visit was conducted utilizing both audio and video telecommunication. The provider, MA and patient participated in this telemedicine encounter.    HPI 47-year-old female with a history of obesity and associated comorbidities of Hypertension, Hyperlipidemia, Joint Pain, Obstructive sleep apnea and Vitamin-D deficiency. Of note, Fani underwent an L4-L5 MAS TLIF with Dr. Federico Cruz on February 21, 2023. Fani had been doing relatively well in her recovery but, unfortunately, she has had a recurrence of the back and radicular leg pain she was having before surgery. In addition, she started having significant right hip pain for which she received an intra-articular joint injection by Dr. Pompa on 11/14/2023. Fani reports her hip pain has significantly improved since her injection. In addition, she is scheduled for a Caudal injection with Dr. Pompa on Friday this week 12/8/2023. She is still using the Gabapentin for the back and radicular leg pain. She is unable to take it TID during the work week as it further increases her daytime somnolence. She also utilizes Percocet very infrequently. She really only takes that when she absolutely has to.     Fani also is being seen today for refills on her anti-obesity medications: Qsymia and Ozempic. She has been doing very well with weight loss; almost down 100lbs since our first visit! She denies nausea vomiting diarrhea constipation or abdominal pain with the use of Ozempic. She denies adverse effects from Qsymia.    Weight Hx:  Initial Weight: 255  Last Visit Weight: 170  Current Weight: 160  Total Loss: 95lbs    Diet Recall:  Breakfast: Almonds, Cheese, Cranberry mix   Lunch: White Chocolate Mocha Coffee  Dinner: Chipotle Veggie Bowl  Snack(s): None    Current exercise routine:  Walking the dog.    Review of Systems Unless noted in the HPI all other systems have been reviewed and are negative for complaint    Objective   Physical Exam Telehealth visit; no PE performed.     Assessment/Plan       TREATMENT PLAN:    BACK/LEG PAIN MANAGEMENT:  I had a nice discussion with the patient today and our plan will be as follows:  Radiology: No new imaging to review at this time.   Physically: Encouraged patient to continue with increased physical activity as able.   Psychologically: No acute psychological needs at this time.    Medication: Needs Gabapentin refilled. Last Oxy RX was from March 2023. Discussed with Dr. Pompa and I will send a 5 day RX for PRN severe pain.   Duration: >1 Year   Intervention: Fani is scheduled with Dr. Pompa for a Caudal injection this Friday (12/8).     2. WEIGHT MANAGEMENT:   47 YO Female with Obesity.  Current BMI: 27  Total Weight Loss: 95lbs  Currently on Ozempic 2mg weekly; denies adverse effects.   Also currently on Qsymia 15-92mg daily; denies adverse effects.   Reinforced good dietary habits.  Recommend increased physical activity as able with a goal of 150-300 minutes per week.   OARRS reviewed.   Risks and benefits discussed.   > 50% of time spent counseling on the importance of following recommended dietary modifications including: role of diet, low calorie and carbohydrate restrictions, limiting fast food and avoiding high sugary beverages.   Also discussed, the role of exercise with an ultimate goal of at least 250-300 minutes a week for weight loss and weight maintenance.   Patient verbalizes understanding.     Follow-up in 2-3 months or sooner, as needed.

## 2023-12-06 ENCOUNTER — ANCILLARY PROCEDURE (OUTPATIENT)
Dept: RADIOLOGY | Facility: CLINIC | Age: 48
End: 2023-12-06
Payer: COMMERCIAL

## 2023-12-06 DIAGNOSIS — Z98.1 S/P LUMBAR FUSION: ICD-10-CM

## 2023-12-06 DIAGNOSIS — M54.16 LUMBAR RADICULOPATHY: ICD-10-CM

## 2023-12-06 PROCEDURE — 72131 CT LUMBAR SPINE W/O DYE: CPT | Performed by: RADIOLOGY

## 2023-12-06 PROCEDURE — 72131 CT LUMBAR SPINE W/O DYE: CPT

## 2023-12-08 ENCOUNTER — PHARMACY VISIT (OUTPATIENT)
Dept: PHARMACY | Facility: CLINIC | Age: 48
End: 2023-12-08
Payer: COMMERCIAL

## 2023-12-08 ENCOUNTER — HOSPITAL ENCOUNTER (OUTPATIENT)
Dept: GASTROENTEROLOGY | Facility: HOSPITAL | Age: 48
Discharge: HOME | End: 2023-12-08
Payer: COMMERCIAL

## 2023-12-08 VITALS
RESPIRATION RATE: 16 BRPM | DIASTOLIC BLOOD PRESSURE: 94 MMHG | TEMPERATURE: 96.8 F | SYSTOLIC BLOOD PRESSURE: 140 MMHG | OXYGEN SATURATION: 100 % | WEIGHT: 160 LBS | BODY MASS INDEX: 27.31 KG/M2 | HEIGHT: 64 IN | HEART RATE: 88 BPM

## 2023-12-08 DIAGNOSIS — M54.16 LUMBAR RADICULOPATHY: ICD-10-CM

## 2023-12-08 PROCEDURE — 62323 NJX INTERLAMINAR LMBR/SAC: CPT | Performed by: ANESTHESIOLOGY

## 2023-12-08 RX ORDER — OXYCODONE HYDROCHLORIDE 5 MG/1
5 TABLET ORAL EVERY 4 HOURS PRN
COMMUNITY
End: 2024-05-01 | Stop reason: WASHOUT

## 2023-12-08 RX ORDER — ASPIRIN 81 MG/1
81 TABLET ORAL DAILY
COMMUNITY

## 2023-12-08 ASSESSMENT — PAIN - FUNCTIONAL ASSESSMENT
PAIN_FUNCTIONAL_ASSESSMENT: 0-10
PAIN_FUNCTIONAL_ASSESSMENT: 0-10

## 2023-12-08 ASSESSMENT — PAIN SCALES - GENERAL
PAINLEVEL_OUTOF10: 0 - NO PAIN
PAINLEVEL_OUTOF10: 4

## 2023-12-08 ASSESSMENT — COLUMBIA-SUICIDE SEVERITY RATING SCALE - C-SSRS
1. IN THE PAST MONTH, HAVE YOU WISHED YOU WERE DEAD OR WISHED YOU COULD GO TO SLEEP AND NOT WAKE UP?: NO
6. HAVE YOU EVER DONE ANYTHING, STARTED TO DO ANYTHING, OR PREPARED TO DO ANYTHING TO END YOUR LIFE?: NO
2. HAVE YOU ACTUALLY HAD ANY THOUGHTS OF KILLING YOURSELF?: NO

## 2023-12-08 ASSESSMENT — PAIN DESCRIPTION - DESCRIPTORS: DESCRIPTORS: ACHING

## 2023-12-08 NOTE — DISCHARGE INSTRUCTIONS
DISCHARGEINSTRUCTIONS FOR INJECTIONS     You underwent Caudal LILY today    Aftermost injections, it is recommended that you relax and limit your activity for the remainder of the day unless you have been told otherwise by your pain physician.  You should not drive a car, operate machinery, or make important legal decisions unless otherwise directed by your pain physician.  You may resume your normal activity, including exercise, tomorrow.      Keep a written pain diary of how much pain relief you experienced following the injection procedure and the length of time of pain relief you experienced pain relief. Following diagnostic injections like medial branch nerve blocks, sacroiliac joint blocks, stellate ganglion injections and other blocks, it is very important you record the specific amount of pain relief you experienced immediately after the injectionand how long it lasted. Your doctor will ask you for this information at your follow up visit.     For all injections, please keep the injection site dry and inspect the site for a couple of days. You may remove the Band-Aid the day of the injection at any time.     Some discomfort, bruising or slight swelling may occur at the injection site. This is not abnormal if it occurs.  If needed you may:    -Take over the counter medication such as Tylenol or Motrin.   -Apply an ice pack for 30 minutes, 2 to 3 times a day for the first 24 hours.     You may shower today; no soaking baths, hot tubs, whirlpools or swimming pools for two days.      If you are given steroids in your injection, it may take 3-5 days for the steroid medication to take effect. You may notice a worsening of your symptoms for 1-2 days after the injection. This is not abnormal.  You may use acetaminophen, ibuprofen, or prescription medication that your doctor may have prescribed for you if you need to do so.     A few common side effects of steroids include facial flushing, sweating, restlessness,  irritability,difficulty sleeping, increase in blood sugar, and increased blood pressure. If you have diabetes, please monitor your blood sugar at least once a day for at least 5 days. If you have poorly controlled high blood pressure, monitoryour blood pressure for at least 2 days and contact your primary care physician if these numbers are unusually high for you.      If you take aspirin or non-steroidal anti-inflammatory drugs (examples are Motrin, Advil, ibuprofen, Naprosyn, Voltaren, Relafen, etc.) you may restart these this evening, but stop taking it 3 days before your next appointment, unless instructed otherwiseby your physician.      You do not need to discontinue non-aspirin-containing pain medications prior to an injection (examples: Celebrex, tramadol, hydrocodone and acetaminophen).      If you take a blood thinning medication (Coumadin, Lovenox, Fragmin,Ticlid, Plavix, Pradaxa, etc.), please discuss this with your primary care physician/cardiologist and your pain physician. These medications MUST be discontinued before you can have an injection safely, without the risk of uncontrolled bleeding. If these medications are not discontinued for an appropriate period of time, you will not be able to receivean injection.      If you are taking Coumadin, please have your INR checked the morning of your procedure and bringthe result to your appointment unless otherwise instructed. If your INR is over 1.2, your injection may need to be rescheduled to avoid uncontrolled bleeding from the needle placement.     Call [facility] at [phone] between 8am-4pm Monday - Friday if you are experiencing the following:    If you received an epidural or spinal injection:    -Headache that doesnot go away with medicine, is worse when sitting or standing up, and is greatly relieved upon lying down.   -Severe pain worse than or different than your baseline pain.   -Chills or fever (101º F or greater).    -Drainage or signs of infection at the injection site     Go directly to the Emergency Department if you are experiencing the following and received an epidural or spinal injection:   -Abrupt weakness or progressive weakness in your legs that starts after you leave the clinic.   -Abrupt severe or worsening numbness in your legs.   -Inability to urinate after the injection or loss of bowel or bladder control without the urge to defecate or urinate.     If you have a clinical question that cannot wait until your next appointment, please call [phone] between 8am-4pm Monday - Friday or send a [employer] message. We do our best to return all non-emergency messages within 24 hours, Monday - Friday. A nurse or physician will return your message.      If you need to cancel an appointment, please call the scheduling staff at [phone] during normal business hours or leave a message at least 24 hours in advance.     If you are going to be sedated for your next procedure, you MUST have responsible adult who can legally drive accompany you home. You cannot eat or drink for eight hours prior to the planned procedure if you are going to receive sedation. You may take your non-blood thinning medications with a small sip of water.

## 2023-12-08 NOTE — OP NOTE
Preprocedure diagnosis: Lumbar radiculopathy  Postprocedure diagnosis Lumbar radiculopathy    Procedure performed: Caudal epidural steroid injection    Physician: Leander Pompa MD    Anesthesia: Local    Complications: none    Blood loss:  none    Clinical note: This is a very pleasant 48-year-old female who suffers with low back and leg pain .here meeting all medical criteria for above-mentioned procedure.    Procedure:  Procedure: Caudal Epidural Steroid Injection    The patient was identified in the preoperative area.  The procedure was discussed in detail including its risks, benefits, and alternatives.  Signed consent was obtained and the patient agreed to proceed.     The patient was brought to the [facility] procedure room and placed in the prone position onto the fluoroscopy table. The lumbosacral area was prepped and draped in the usual sterile fashion using Chloroprep and a fenestrated drape.  Under fluoroscopic guidance in the lateral view the sacrum was imaged and the the intended needle insertion site was marked onto the skin.  The skin was anesthetized with 5ml of 2% lidocaine.  After adequate skin anesthesia was obtained, a 22 gauge spinale needle was inserted through the sacro-coccygeal ligament into the epidural space under fluoroscopic guidance in the AP view.  The needle was aspirated and 1 ml of Omnipaque contrast dye was injected under live fluoroscopy which showed epidural spread.  The needle was then advanced under intermittent fluoroscopic guidance in the AP until the needle tip was between the S2 and S3 neuro-foramen.  The needle was aspirated for heme and csf again, which was negative, and 1 ml of Omnipaque was injected under live fluoroscopy which showed appropriate spread without intrathecal or intravascular uptake.  Then 9 ml of 0.5% preservative free lidocaine and 40 mg of triamcinolone was injected.  The needle was removed and a sterile bandage was applied. The patient was take back to  the recovery area.     The procedure was completed without complications and was tolerated well. The patient was monitored after the procedure. The patient (or responsible party) was given post-procedure and discharge instructions to follow at home. The patient was discharged in stable condition. A follow up appointment was made.

## 2023-12-15 ENCOUNTER — APPOINTMENT (OUTPATIENT)
Dept: SLEEP MEDICINE | Facility: CLINIC | Age: 48
End: 2023-12-15
Payer: COMMERCIAL

## 2023-12-23 ENCOUNTER — TELEPHONE (OUTPATIENT)
Dept: PAIN MEDICINE | Facility: CLINIC | Age: 48
End: 2023-12-23
Payer: COMMERCIAL

## 2023-12-23 DIAGNOSIS — M47.817 SPONDYLOSIS WITHOUT MYELOPATHY OR RADICULOPATHY, LUMBOSACRAL REGION: Primary | ICD-10-CM

## 2023-12-23 RX ORDER — OXYCODONE AND ACETAMINOPHEN 5; 325 MG/1; MG/1
1 TABLET ORAL EVERY 6 HOURS PRN
Qty: 20 TABLET | Refills: 0 | Status: SHIPPED | OUTPATIENT
Start: 2023-12-23 | End: 2023-12-28

## 2023-12-23 NOTE — TELEPHONE ENCOUNTER
Pharmacy claims they never received the RX I sent on 12/5. Resending to new pharmacy as that [facility] gave patient difficult time regarding filling her opioid script. PDMP reviewed.

## 2023-12-26 PROCEDURE — RXMED WILLOW AMBULATORY MEDICATION CHARGE

## 2023-12-27 ENCOUNTER — PHARMACY VISIT (OUTPATIENT)
Dept: PHARMACY | Facility: CLINIC | Age: 48
End: 2023-12-27
Payer: COMMERCIAL

## 2023-12-28 ENCOUNTER — OFFICE VISIT (OUTPATIENT)
Dept: PAIN MEDICINE | Facility: CLINIC | Age: 48
End: 2023-12-28
Payer: COMMERCIAL

## 2023-12-28 ENCOUNTER — LAB (OUTPATIENT)
Dept: LAB | Facility: LAB | Age: 48
End: 2023-12-28
Payer: COMMERCIAL

## 2023-12-28 VITALS
SYSTOLIC BLOOD PRESSURE: 132 MMHG | RESPIRATION RATE: 16 BRPM | BODY MASS INDEX: 26.98 KG/M2 | DIASTOLIC BLOOD PRESSURE: 80 MMHG | WEIGHT: 158 LBS | HEIGHT: 64 IN

## 2023-12-28 DIAGNOSIS — Z00.00 HEALTHCARE MAINTENANCE: ICD-10-CM

## 2023-12-28 DIAGNOSIS — M54.16 LUMBAR RADICULOPATHY: Primary | ICD-10-CM

## 2023-12-28 LAB
ALBUMIN SERPL BCP-MCNC: 3.8 G/DL (ref 3.4–5)
ALP SERPL-CCNC: 70 U/L (ref 33–110)
ALT SERPL W P-5'-P-CCNC: 19 U/L (ref 7–45)
ANION GAP SERPL CALC-SCNC: 10 MMOL/L (ref 10–20)
AST SERPL W P-5'-P-CCNC: 16 U/L (ref 9–39)
BASOPHILS # BLD AUTO: 0.02 X10*3/UL (ref 0–0.1)
BASOPHILS NFR BLD AUTO: 0.3 %
BILIRUB SERPL-MCNC: 1.1 MG/DL (ref 0–1.2)
BUN SERPL-MCNC: 15 MG/DL (ref 6–23)
CALCIUM SERPL-MCNC: 9.2 MG/DL (ref 8.6–10.3)
CHLORIDE SERPL-SCNC: 108 MMOL/L (ref 98–107)
CHOLEST SERPL-MCNC: 118 MG/DL (ref 0–199)
CHOLESTEROL/HDL RATIO: 2.4
CO2 SERPL-SCNC: 24 MMOL/L (ref 21–32)
CREAT SERPL-MCNC: 1.01 MG/DL (ref 0.5–1.05)
EOSINOPHIL # BLD AUTO: 0.04 X10*3/UL (ref 0–0.7)
EOSINOPHIL NFR BLD AUTO: 0.6 %
ERYTHROCYTE [DISTWIDTH] IN BLOOD BY AUTOMATED COUNT: 13.9 % (ref 11.5–14.5)
EST. AVERAGE GLUCOSE BLD GHB EST-MCNC: 103 MG/DL
GFR SERPL CREATININE-BSD FRML MDRD: 68 ML/MIN/1.73M*2
GLUCOSE SERPL-MCNC: 81 MG/DL (ref 74–99)
HBA1C MFR BLD: 5.2 %
HCT VFR BLD AUTO: 46.7 % (ref 36–46)
HDLC SERPL-MCNC: 49.3 MG/DL
HGB BLD-MCNC: 14.8 G/DL (ref 12–16)
IMM GRANULOCYTES # BLD AUTO: 0.03 X10*3/UL (ref 0–0.7)
IMM GRANULOCYTES NFR BLD AUTO: 0.4 % (ref 0–0.9)
LDLC SERPL CALC-MCNC: 60 MG/DL
LYMPHOCYTES # BLD AUTO: 2.01 X10*3/UL (ref 1.2–4.8)
LYMPHOCYTES NFR BLD AUTO: 27.7 %
MCH RBC QN AUTO: 29.4 PG (ref 26–34)
MCHC RBC AUTO-ENTMCNC: 31.7 G/DL (ref 32–36)
MCV RBC AUTO: 93 FL (ref 80–100)
MONOCYTES # BLD AUTO: 0.5 X10*3/UL (ref 0.1–1)
MONOCYTES NFR BLD AUTO: 6.9 %
NEUTROPHILS # BLD AUTO: 4.65 X10*3/UL (ref 1.2–7.7)
NEUTROPHILS NFR BLD AUTO: 64.1 %
NON HDL CHOLESTEROL: 69 MG/DL (ref 0–149)
NRBC BLD-RTO: 0 /100 WBCS (ref 0–0)
PLATELET # BLD AUTO: 342 X10*3/UL (ref 150–450)
POTASSIUM SERPL-SCNC: 4 MMOL/L (ref 3.5–5.3)
PROT SERPL-MCNC: 6.4 G/DL (ref 6.4–8.2)
RBC # BLD AUTO: 5.03 X10*6/UL (ref 4–5.2)
SODIUM SERPL-SCNC: 138 MMOL/L (ref 136–145)
TRIGL SERPL-MCNC: 42 MG/DL (ref 0–149)
TSH SERPL-ACNC: 1.2 MIU/L (ref 0.44–3.98)
VLDL: 8 MG/DL (ref 0–40)
WBC # BLD AUTO: 7.3 X10*3/UL (ref 4.4–11.3)

## 2023-12-28 PROCEDURE — 83036 HEMOGLOBIN GLYCOSYLATED A1C: CPT

## 2023-12-28 PROCEDURE — 80053 COMPREHEN METABOLIC PANEL: CPT

## 2023-12-28 PROCEDURE — 99213 OFFICE O/P EST LOW 20 MIN: CPT | Performed by: ANESTHESIOLOGY

## 2023-12-28 PROCEDURE — 1036F TOBACCO NON-USER: CPT | Performed by: ANESTHESIOLOGY

## 2023-12-28 PROCEDURE — 80061 LIPID PANEL: CPT

## 2023-12-28 PROCEDURE — 84443 ASSAY THYROID STIM HORMONE: CPT

## 2023-12-28 PROCEDURE — 36415 COLL VENOUS BLD VENIPUNCTURE: CPT

## 2023-12-28 PROCEDURE — 85025 COMPLETE CBC W/AUTO DIFF WBC: CPT

## 2023-12-28 PROCEDURE — 3008F BODY MASS INDEX DOCD: CPT | Performed by: ANESTHESIOLOGY

## 2023-12-28 ASSESSMENT — ENCOUNTER SYMPTOMS
MYALGIAS: 1
ALLERGIC/IMMUNOLOGIC NEGATIVE: 1
RESPIRATORY NEGATIVE: 1
EYES NEGATIVE: 1
GASTROINTESTINAL NEGATIVE: 1
BACK PAIN: 1
CONSTITUTIONAL NEGATIVE: 1
NUMBNESS: 1
PSYCHIATRIC NEGATIVE: 1
HEMATOLOGIC/LYMPHATIC NEGATIVE: 1
CARDIOVASCULAR NEGATIVE: 1
ENDOCRINE NEGATIVE: 1
ARTHRALGIAS: 1

## 2023-12-28 ASSESSMENT — PAIN - FUNCTIONAL ASSESSMENT: PAIN_FUNCTIONAL_ASSESSMENT: 0-10

## 2023-12-28 ASSESSMENT — PATIENT HEALTH QUESTIONNAIRE - PHQ9: 2. FEELING DOWN, DEPRESSED OR HOPELESS: NOT AT ALL

## 2023-12-28 ASSESSMENT — PAIN SCALES - GENERAL
PAINLEVEL: 3
PAINLEVEL_OUTOF10: 3

## 2023-12-28 NOTE — PROGRESS NOTES
Subjective   Patient ID: Fani Garcia is a 48 y.o. female who presents for Back Pain.  Back Pain  Associated symptoms include numbness.     Patient today for follow-up from caudal epidural steroid injection.  She reports that she is 90% better.  Pain levels are 1-3 out of 10.  She is able to stand for 15 minutes at a time now.  She is very happy with the outcomes does not feel she needs any additional therapies.  She is not taking any medications at this time.  She reports no numbness, tingling, weakness in the lower extremities.  She is very happy with the outcomes of her procedure.  Review of Systems   Constitutional: Negative.    HENT: Negative.     Eyes: Negative.    Respiratory: Negative.     Cardiovascular: Negative.    Gastrointestinal: Negative.    Endocrine: Negative.    Genitourinary: Negative.    Musculoskeletal:  Positive for arthralgias, back pain and myalgias.   Skin: Negative.    Allergic/Immunologic: Negative.    Neurological:  Positive for numbness.   Hematological: Negative.    Psychiatric/Behavioral: Negative.         Objective   Physical Exam  Vitals and nursing note reviewed.   Constitutional:       Appearance: Normal appearance.   HENT:      Head: Normocephalic and atraumatic.      Right Ear: Ear canal and external ear normal.      Left Ear: Ear canal and external ear normal.      Nose: Nose normal.      Mouth/Throat:      Mouth: Mucous membranes are moist.      Pharynx: Oropharynx is clear.   Eyes:      Conjunctiva/sclera: Conjunctivae normal.      Pupils: Pupils are equal, round, and reactive to light.   Cardiovascular:      Rate and Rhythm: Normal rate.   Pulmonary:      Effort: Pulmonary effort is normal. No respiratory distress.   Musculoskeletal:      Cervical back: Normal range of motion and neck supple.      Lumbar back: Tenderness present. Decreased range of motion. Negative right straight leg raise test and negative left straight leg raise test.        Back:       Right hip:  Tenderness (right GT) present.        Legs:    Skin:     General: Skin is warm and dry.   Neurological:      Mental Status: She is alert and oriented to person, place, and time.      Motor: Motor function is intact.      Coordination: Coordination is intact.      Gait: Gait is intact.      Deep Tendon Reflexes:      Reflex Scores:       Patellar reflexes are 2+ on the right side and 2+ on the left side.  Psychiatric:         Mood and Affect: Mood normal.         Thought Content: Thought content normal.         Assessment/Plan   Problem List Items Addressed This Visit    None  Visit Diagnoses         Codes    Lumbar radiculopathy    -  Primary M54.16        I nice discussion with the patient today our plan will be as follows.    Radiology: All available imaging was reviewed today.    Physically: Continue home exercise program.    Psychologically: No issues at this time.    Medication: Continue OTC medications.    Duration: Greater than 1 year.    Intervention: Patient with a 90% reduction in pain after caudal epidural steroid injection.  Patient doing excellent time with low pain levels.  We will repeat on a as needed basis.           Leander Pompa MD 12/28/23 3:57 PM

## 2023-12-29 NOTE — RESULT ENCOUNTER NOTE
Sugar, kidneys, liver, electrodes are all within normal limits other than chloride being 1 point above normal but overall this has been stable over the past year    Complete blood cell count shows no anemia    Cholesterol one of the best on file at 118, HDL 49, LDL 60, triglycerides 42    Thyroid well within normal limits    Hemoglobin A1c well within normal is at 5.2%    Keep up the great work

## 2024-01-04 ENCOUNTER — HOSPITAL ENCOUNTER (EMERGENCY)
Facility: HOSPITAL | Age: 49
Discharge: HOME | End: 2024-01-04
Payer: COMMERCIAL

## 2024-01-04 ENCOUNTER — APPOINTMENT (OUTPATIENT)
Dept: RADIOLOGY | Facility: HOSPITAL | Age: 49
End: 2024-01-04
Payer: COMMERCIAL

## 2024-01-04 VITALS
OXYGEN SATURATION: 98 % | SYSTOLIC BLOOD PRESSURE: 148 MMHG | HEART RATE: 88 BPM | WEIGHT: 160 LBS | RESPIRATION RATE: 17 BRPM | HEIGHT: 65 IN | DIASTOLIC BLOOD PRESSURE: 88 MMHG | BODY MASS INDEX: 26.66 KG/M2 | TEMPERATURE: 97.8 F

## 2024-01-04 DIAGNOSIS — S00.93XA CONTUSION OF HEAD, UNSPECIFIED PART OF HEAD, INITIAL ENCOUNTER: Primary | ICD-10-CM

## 2024-01-04 PROCEDURE — 73080 X-RAY EXAM OF ELBOW: CPT | Mod: RT

## 2024-01-04 PROCEDURE — 72100 X-RAY EXAM L-S SPINE 2/3 VWS: CPT

## 2024-01-04 PROCEDURE — 72170 X-RAY EXAM OF PELVIS: CPT

## 2024-01-04 PROCEDURE — 70450 CT HEAD/BRAIN W/O DYE: CPT | Performed by: RADIOLOGY

## 2024-01-04 PROCEDURE — 72100 X-RAY EXAM L-S SPINE 2/3 VWS: CPT | Mod: FOREIGN READ | Performed by: RADIOLOGY

## 2024-01-04 PROCEDURE — 73080 X-RAY EXAM OF ELBOW: CPT | Mod: RIGHT SIDE

## 2024-01-04 PROCEDURE — 99284 EMERGENCY DEPT VISIT MOD MDM: CPT

## 2024-01-04 PROCEDURE — 70450 CT HEAD/BRAIN W/O DYE: CPT

## 2024-01-04 ASSESSMENT — COLUMBIA-SUICIDE SEVERITY RATING SCALE - C-SSRS
2. HAVE YOU ACTUALLY HAD ANY THOUGHTS OF KILLING YOURSELF?: NO
1. IN THE PAST MONTH, HAVE YOU WISHED YOU WERE DEAD OR WISHED YOU COULD GO TO SLEEP AND NOT WAKE UP?: NO
6. HAVE YOU EVER DONE ANYTHING, STARTED TO DO ANYTHING, OR PREPARED TO DO ANYTHING TO END YOUR LIFE?: NO

## 2024-01-04 ASSESSMENT — PAIN SCALES - GENERAL: PAINLEVEL_OUTOF10: 8

## 2024-01-04 ASSESSMENT — PAIN - FUNCTIONAL ASSESSMENT: PAIN_FUNCTIONAL_ASSESSMENT: 0-10

## 2024-01-04 NOTE — ED PROVIDER NOTES
HPI   Chief Complaint   Patient presents with    Fall       Well-appearing 48-year-old female no current medical problems chief complaint today of head injury, right elbow injury, low back pain.  Patient states she slipped and fell on the ice prior to arrival.  She states she does have a slight headache.  She denies being on blood thinners with exception of aspirin.  She said she is having no other associated symptoms of nausea vomiting dizziness lightheadedness.    Family history: Reviewed and not pertinent to presenting complaint.  Social history: Non-smoker, nondrinker.    Gen.: No weight loss, fatigue, anorexia, insomnia, fever.  Eyes: No vision loss, double vision, drainage, eye pain.  ENT: No pharyngitis, dry mouth.  Cardiac: No chest pain, palpitations, syncope, near syncope.  Pulmonary: No shortness of breath, cough, hemoptysis.  Heme/lymph: No swollen glands, fever, bleeding.  GI: No abdominal pain, change in bowel habits, melena, hematemesis, hematochezia, nausea, vomiting, diarrhea.  : No discharge, dysuria, frequency, urgency, hematuria.  Musculoskeletal: No limb pain, joint pain, joint swelling.  Skin: No rashes.  Psych: No depression, anxiety, suicidality, homicidality.  Review of systems is otherwise negative unless stated above or in history of present illness.    General: Vitals noted, no distress. Afebrile.  HEENT: Normocephalic atraumatic, PERRLA. No nystagmus, no diplopia, No trismus. TMs clear. Posterior oropharynx unremarkable. No meningismus.  Cardiac: Regular, rate, rhythm, no murmur.  Pulmonary: Lungs clear bilaterally with good aeration. No adventitious breath sounds.  Abdomen: Soft, nonsurgical. Nontender. No peritoneal signs. Normoactive bowel sounds.  Extremities: No peripheral edema. Negative Homans bilaterally, no cords.  Skin: No rash.  Neuro: No focal neurologic deficits, NIH score of 0.    ED course and plan MDM  Well-appearing 48-year-old female no current medical problems chief  complaint today of head injury by falling on ice prior to arrival.  On exam she has mild tenderness to palpation of the right elbow.  Mild tenderness palpation to the lower lumbar spinal region.  X-rays ordered of right elbow, lumbar spine, CT scan head.  All x-rays are unremarkable per radiology read.  Advised patient results.  Prescribed ibuprofen as necessary for pain.  She is agreeable to discharge.  Differential includes subdural hematoma, occult fracture, contusion.                          No data recorded                Patient History   Past Medical History:   Diagnosis Date    Acetabular labrum tear 10/05/2023    Acute pharyngitis, unspecified 03/19/2014    Sorethroat    Acute vaginitis 01/15/2015    Bacterial vaginosis    Benign essential hypertension 10/05/2023    Encounter for gynecological examination (general) (routine) without abnormal findings 12/11/2020    Encounter for gynecological examination    Myopia, unspecified eye 11/11/2016    Myopia with astigmatism and presbyopia    Obesity, unspecified 09/02/2020    Obesity (BMI 30-39.9)    Obstructive sleep apnea (adult) (pediatric) 10/02/2015    Obstructive sleep apnea    Other pulmonary embolism without acute cor pulmonale (CMS/[facility]) 07/20/2013    Pulmonary embolism    Otitis media, unspecified, left ear 03/19/2014    Acute left otitis media    Personal history of other diseases of the circulatory system     History of hypertension    Personal history of other diseases of the digestive system 03/26/2019    History of gastroesophageal reflux (GERD)    Personal history of other diseases of the musculoskeletal system and connective tissue 07/24/2020    History of fibromyalgia    Personal history of other diseases of the musculoskeletal system and connective tissue 01/20/2020    History of muscle weakness    Personal history of other diseases of the nervous system and sense organs 04/28/2014    History of earache    Personal history of other diseases of  the respiratory system 2015    History of influenza    Personal history of other diseases of the respiratory system 2014    History of streptococcal pharyngitis    Personal history of other diseases of the respiratory system 2013    History of acute pharyngitis    Personal history of other endocrine, nutritional and metabolic disease 2021    History of morbid obesity    Personal history of other endocrine, nutritional and metabolic disease 2020    History of morbid obesity    Personal history of other infectious and parasitic diseases     History of herpes simplex infection    Personal history of other specified conditions 2020    History of palpitations    Personal history of other specified conditions 2019    History of dizziness    Personal history of other specified conditions 2020    History of fatigue    Plantar fascial fibromatosis 2015    Plantar fasciitis of left foot    Rash and other nonspecific skin eruption 2020    Rash of neck    Shortness of breath 2020    SOB (shortness of breath) on exertion     Past Surgical History:   Procedure Laterality Date     SECTION, CLASSIC  10/02/2015     Section    DILATION AND CURETTAGE OF UTERUS  2013    Dilation And Curettage    LAPAROSCOPY DIAGNOSTIC / BIOPSY / ASPIRATION / LYSIS  2013    Exploratory Laparoscopy     Family History   Problem Relation Name Age of Onset    Other (Anxiety) Mother      Other (Cardiac disorder) Mother      Depression Mother      Hyperlipidemia Mother      Hypertension Mother      Heart attack Mother      Osteoporosis Mother      ALS Father      Colon cancer Brother      Diabetes Brother      Hyperlipidemia Brother      Hypertension Brother      Diabetes Maternal Grandmother      Ovarian cancer Maternal Grandmother      Diabetes Maternal Grandfather      Liver cancer Maternal Grandfather      Diabetes Paternal Grandmother      Diabetes Paternal  Grandfather       Social History     Tobacco Use    Smoking status: Never    Smokeless tobacco: Never   Substance Use Topics    Alcohol use: Never    Drug use: Never       Physical Exam   ED Triage Vitals [01/04/24 1016]   Temp Heart Rate Resp BP   36.6 °C (97.8 °F) 88 17 148/88      SpO2 Temp src Heart Rate Source Patient Position   98 % -- -- --      BP Location FiO2 (%)     -- --       Physical Exam    ED Course & MDM        Medical Decision Making      Procedure  Procedures     Nolan Whitheead PA-C  01/04/24 1257

## 2024-01-04 NOTE — ED TRIAGE NOTES
C/O RIGHT ELBOW/BACK/HEAD PAIN D/T FALLING ON BLACK ICE TODAY AT 0900, DENIES LOC, -BLOOD THINNERS, DENIES DIZZINESS PRIOR/POST FALL

## 2024-02-16 PROCEDURE — RXMED WILLOW AMBULATORY MEDICATION CHARGE

## 2024-02-20 ENCOUNTER — PHARMACY VISIT (OUTPATIENT)
Dept: PHARMACY | Facility: CLINIC | Age: 49
End: 2024-02-20
Payer: COMMERCIAL

## 2024-03-08 ENCOUNTER — OFFICE VISIT (OUTPATIENT)
Dept: SLEEP MEDICINE | Facility: CLINIC | Age: 49
End: 2024-03-08
Payer: COMMERCIAL

## 2024-03-08 VITALS
DIASTOLIC BLOOD PRESSURE: 82 MMHG | SYSTOLIC BLOOD PRESSURE: 154 MMHG | HEART RATE: 77 BPM | WEIGHT: 165 LBS | TEMPERATURE: 97.5 F | BODY MASS INDEX: 27.83 KG/M2

## 2024-03-08 DIAGNOSIS — F51.12 INSUFFICIENT SLEEP SYNDROME: ICD-10-CM

## 2024-03-08 DIAGNOSIS — G47.33 OSA (OBSTRUCTIVE SLEEP APNEA): ICD-10-CM

## 2024-03-08 DIAGNOSIS — G47.19 EXCESSIVE DAYTIME SLEEPINESS: ICD-10-CM

## 2024-03-08 DIAGNOSIS — G47.10 HYPERSOMNOLENCE: ICD-10-CM

## 2024-03-08 DIAGNOSIS — G47.33 OBSTRUCTIVE SLEEP APNEA SYNDROME IN ADULT: Primary | ICD-10-CM

## 2024-03-08 PROCEDURE — 3008F BODY MASS INDEX DOCD: CPT | Performed by: STUDENT IN AN ORGANIZED HEALTH CARE EDUCATION/TRAINING PROGRAM

## 2024-03-08 PROCEDURE — 1036F TOBACCO NON-USER: CPT | Performed by: STUDENT IN AN ORGANIZED HEALTH CARE EDUCATION/TRAINING PROGRAM

## 2024-03-08 PROCEDURE — 99204 OFFICE O/P NEW MOD 45 MIN: CPT | Performed by: STUDENT IN AN ORGANIZED HEALTH CARE EDUCATION/TRAINING PROGRAM

## 2024-03-08 ASSESSMENT — SLEEP AND FATIGUE QUESTIONNAIRES
HOW LIKELY ARE YOU TO NOD OFF OR FALL ASLEEP WHILE SITTING AND READING: HIGH CHANCE OF DOZING
HOW LIKELY ARE YOU TO NOD OFF OR FALL ASLEEP WHILE SITTING AND TALKING TO SOMEONE: WOULD NEVER DOZE
HOW LIKELY ARE YOU TO NOD OFF OR FALL ASLEEP WHEN YOU ARE A PASSENGER IN A CAR FOR AN HOUR WITHOUT A BREAK: HIGH CHANCE OF DOZING
WORRIED_DISTRESSED_DUE_TO_SLEEP: VERY MUCH NOTICEABLE
HOW LIKELY ARE YOU TO NOD OFF OR FALL ASLEEP WHILE LYING DOWN TO REST IN THE AFTERNOON WHEN CIRCUMSTANCES PERMIT: HIGH CHANCE OF DOZING
SATISFACTION_WITH_CURRENT_SLEEP_PATTERN: DISSATISFIED
SLEEP_PROBLEM_INTERFERES_DAILY_ACTIVITIES: VERY MUCH NOTICEABLE
HOW LIKELY ARE YOU TO NOD OFF OR FALL ASLEEP IN A CAR, WHILE STOPPED FOR A FEW MINUTES IN TRAFFIC: SLIGHT CHANCE OF DOZING
SITING INACTIVE IN A PUBLIC PLACE LIKE A CLASS ROOM OR A MOVIE THEATER: HIGH CHANCE OF DOZING
SLEEP_PROBLEM_NOTICEABLE_TO_OTHERS: VERY MUCH NOTICEABLE
HOW LIKELY ARE YOU TO NOD OFF OR FALL ASLEEP WHILE WATCHING TV: HIGH CHANCE OF DOZING
ESS-CHAD TOTAL SCORE: 19
HOW LIKELY ARE YOU TO NOD OFF OR FALL ASLEEP WHILE SITTING QUIETLY AFTER LUNCH WITHOUT ALCOHOL: HIGH CHANCE OF DOZING

## 2024-03-08 ASSESSMENT — ENCOUNTER SYMPTOMS
CONSTITUTIONAL NEGATIVE: 1
NEUROLOGICAL NEGATIVE: 1
PSYCHIATRIC NEGATIVE: 1
RESPIRATORY NEGATIVE: 1
CARDIOVASCULAR NEGATIVE: 1

## 2024-03-08 NOTE — PROGRESS NOTES
Patient: Fani Garcia    [id number]  : 1975 -- AGE 48 y.o.    Provider: David James MD     Location [facility]   Service Date: 3/8/2024              [facility]  New Visit Note      HISTORY OF PRESENT ILLNESS     The patient's referring provider is: Dang Calvert DO    HISTORY OF PRESENT ILLNESS   Fani Garcia is a 48 y.o. female with h/o JOSE, Depression, and Obesity who presents to a [facility] for a sleep medicine evaluation with concerns of Establish Care.     Past Sleep History  Patient has the following sleep-related diagnoses: obstructive sleep apnea. Prior sleep study results: significant for severe JOSE with AHI ~ 30    Current History    On today's visit, the patient reports that she is feeling sleepy during the day.  She reports that she was diagnosed with JOSE years ago and first saw Dr. Dianna Vital and was started on CPAP therapy.  Then she followed with Dr. Rajput and was last seen in 2019.  She reports that she has been using CPAP very consistently and recently finding some leak with her PAP therapy (WISP mask).  She has also lost about 100# through medication management.  She initially needed her PAP pressure to be higher due to air hunger and was set at 11-20 cwp.  She also reports sleeping about 4-5 hours nightly due to the amount of time it takes for her to finish up her work.  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  She would usually come home and take a 40 min nap.  She also had a PSG/MSLT done with [facility] about 8.5 minute without SOREM (on Cymbalta at the time of testing) and was diagnosed with IH.  She tried Modafinil but felt like it was keeping her awake better but not really giving her the energy she was after.    Sleep schedule:      Weekdays / Work Days Weekends / Days Off   Bedtime 2:30 am  1 AM   Sleep latency 5 min same as weekdays   Wake time 6:45 am  9 AM    Total sleep time  Average/day:  Total sleep time 4-5 hours/day Total sleep time 8-10 hours/day   Naps Yes, for couple times per week for 40min. Naps are refreshing and sometimes she would take 2~3 x 1-2 hour nap on the weekends   Nocturnal awakenings Yes, about 2 times a night     Preferred sleeping position: SLEEP POSITION: sidelying    Sleep-related ROS:    Sleep Initiation: no problems going to sleep  Problems going to sleep associated with: No problems going to sleep    Sleep Maintenance: wakes up about 2 times per night and easily returns to sleep after awakening  Problems staying asleep associated with: Problems Staying Asleep: no clear reason    Breathing during sleep: no symptoms of snoring or concerns of breathing at night with PAP.    RLS screen:  RLSSCREEN: - Sensations: Patient does not have unusual sensations in their extremities that cause an urge to move them     Daytime Symptoms  On awakening patient reports: morning headaches, morning dry mouth, and morning sore throat    Patient report some daytime symptoms including: DAYTIME SYMPTOMS: excessive daytime sleepiness  Patient denies daytime symptoms including: Denies: feeling sleepy when driving  Fatigue: symptoms bothersome, but easily able to carry out all usual work/school/family activities    ESS: 19  JIMMIE: 15  FOSQ: 18      REVIEW OF SYSTEMS     REVIEW OF SYSTEMS  Review of Systems   Constitutional: Negative.    HENT: Negative.     Respiratory: Negative.     Cardiovascular: Negative.    Genitourinary: Negative.    Skin: Negative.    Neurological: Negative.    Psychiatric/Behavioral: Negative.       SLEEP ROS: snoring      ALLERGIES AND MEDICATIONS     ALLERGIES  Allergies   Allergen Reactions    Lisinopril Nausea And Vomiting and Unknown    Meperidine Hives, Rash and Unknown       MEDICATIONS  Current Outpatient Medications   Medication Sig Dispense Refill    aspirin 81 mg EC tablet Take 1 tablet (81 mg) by mouth once daily.      celecoxib  (CeleBREX) 200 mg capsule Take 1 capsule (200 mg) by mouth 2 times a day. 60 capsule 5    cholecalciferol (Vitamin D-3) 50 MCG (2000 UT) tablet Take 1 tablet (50 mcg) by mouth once daily.      DULoxetine (Cymbalta) 60 mg DR capsule Take 1 capsule (60 mg) by mouth once daily. Do not crush or chew. 90 capsule 1    fluoride, sodium, (PreviDent 5000 Booster Plus) 1.1 % dental paste Use As Directed      norethindrone (Aygestin) 5 mg tablet TAKE 1 TABLET BY MOUTH ONCE DAILY 90 tablet 3    omeprazole (PriLOSEC) 20 mg DR capsule TAKE 1 CAPSULE BY MOUTH EVERY DAY 30 MINUTES BEFORE MORNING MEAL FOR 90 DAYS 90 capsule 1    oxyCODONE (Roxicodone) 5 mg immediate release tablet Take 1 tablet (5 mg) by mouth every 4 hours if needed for severe pain (7 - 10).      rosuvastatin (Crestor) 10 mg tablet Take 1 tablet (10 mg) by mouth once every 24 hours. 90 tablet 1    semaglutide 2 mg/dose (8 mg/3 mL) pen injector INJECT 2MG UNDER THE SKIN ONCE WEEKLY 9 mL 2    valACYclovir (Valtrex) 500 mg tablet Take 1 tablet (500 mg) by mouth once daily. 90 tablet 1    gabapentin (Neurontin) 100 mg capsule Take 1 capsule (100 mg) by mouth 3 times a day. 270 capsule 2    phentermine-topiramate (Qsymia) 15-92 mg capsule, ER multiphase 24 hr Take 15-92 mg by mouth once daily. 30 capsule 3     No current facility-administered medications for this visit.         PAST HISTORY     PAST MEDICAL HISTORY  She  has a past medical history of Acetabular labrum tear (10/05/2023), Acute pharyngitis, unspecified (03/19/2014), Acute vaginitis (01/15/2015), Benign essential hypertension (10/05/2023), Encounter for gynecological examination (general) (routine) without abnormal findings (12/11/2020), Myopia, unspecified eye (11/11/2016), Obesity, unspecified (09/02/2020), Obstructive sleep apnea (adult) (pediatric) (10/02/2015), Other pulmonary embolism without acute cor pulmonale (CMS/HCC) (07/20/2013), Otitis media, unspecified, left ear (03/19/2014), Personal  history of other diseases of the circulatory system, Personal history of other diseases of the digestive system (2019), Personal history of other diseases of the musculoskeletal system and connective tissue (2020), Personal history of other diseases of the musculoskeletal system and connective tissue (2020), Personal history of other diseases of the nervous system and sense organs (2014), Personal history of other diseases of the respiratory system (2015), Personal history of other diseases of the respiratory system (2014), Personal history of other diseases of the respiratory system (2013), Personal history of other endocrine, nutritional and metabolic disease (2021), Personal history of other endocrine, nutritional and metabolic disease (2020), Personal history of other infectious and parasitic diseases, Personal history of other specified conditions (2020), Personal history of other specified conditions (2019), Personal history of other specified conditions (2020), Plantar fascial fibromatosis (2015), Rash and other nonspecific skin eruption (2020), and Shortness of breath (2020).    PAST SURGICAL HISTORY:  Past Surgical History:   Procedure Laterality Date     SECTION, CLASSIC  10/02/2015     Section    DILATION AND CURETTAGE OF UTERUS  2013    Dilation And Curettage    LAPAROSCOPY DIAGNOSTIC / BIOPSY / ASPIRATION / LYSIS  2013    Exploratory Laparoscopy       FAMILY HISTORY  Family History   Problem Relation Name Age of Onset    Other (Anxiety) Mother      Other (Cardiac disorder) Mother      Depression Mother      Hyperlipidemia Mother      Hypertension Mother      Heart attack Mother      Osteoporosis Mother      ALS Father      Colon cancer Brother      Diabetes Brother      Hyperlipidemia Brother      Hypertension Brother      Diabetes Maternal Grandmother      Ovarian cancer Maternal  Grandmother      Diabetes Maternal Grandfather      Liver cancer Maternal Grandfather      Diabetes Paternal Grandmother      Diabetes Paternal Grandfather         She does have a family history of sleep disorder.    SOCIAL HISTORY  She  reports that she has never smoked. She has never used smokeless tobacco. She reports that she does not drink alcohol and does not use drugs. She currently lives with her .     Caffeine consumption: Yes, 1 cups/day  Alcohol consumption: Yes, rarely (occasional)  Smoking: No  Marijuana: No      PHYSICAL EXAM     VITAL SIGNS: /82 (BP Location: Right arm, Patient Position: Sitting, BP Cuff Size: Adult)   Pulse 77   Temp 36.4 °C (97.5 °F)   Wt 74.8 kg (165 lb)   BMI 27.83 kg/m²      CURRENT WEIGHT:   Vitals:    03/08/24 0909   Weight: 74.8 kg (165 lb)     Body mass index is 27.83 kg/m².  PREVIOUS WEIGHTS:  Wt Readings from Last 3 Encounters:   03/08/24 74.8 kg (165 lb)   01/04/24 72.6 kg (160 lb)   12/28/23 71.7 kg (158 lb)       Physical Exam  Vitals reviewed.   Constitutional:       General: She is not in acute distress.     Appearance: Normal appearance. She is well-developed and normal weight.   HENT:      Head: Normocephalic and atraumatic.      Nose: Nose normal. No congestion or rhinorrhea.      Mouth/Throat:      Mouth: Mucous membranes are moist.      Pharynx: Oropharynx is clear. No oropharyngeal exudate.   Eyes:      General: No scleral icterus.     Extraocular Movements: Extraocular movements intact.      Conjunctiva/sclera: Conjunctivae normal.      Pupils: Pupils are equal, round, and reactive to light.   Neck:      Thyroid: No thyroid mass or thyroid tenderness.      Vascular: No JVD.   Cardiovascular:      Rate and Rhythm: Normal rate.      Pulses: Normal pulses.   Pulmonary:      Effort: Pulmonary effort is normal. No respiratory distress.   Musculoskeletal:      Cervical back: Normal range of motion and neck supple. No rigidity or tenderness.    Lymphadenopathy:      Cervical: No cervical adenopathy.   Neurological:      Mental Status: She is alert and oriented to person, place, and time.   Psychiatric:         Mood and Affect: Mood normal.         Behavior: Behavior normal.         Thought Content: Thought content normal.       PHYSICAL EXAM: MODIFIED MALLAMPATI SCORE: III (soft and hard palate and base of uvula visible)  TONGUE SCALLOPING: with scalloping      RESULTS/DATA     Bicarbonate   Date Value   12/28/2023 24 mmol/L   02/23/2023 CANCELED   02/22/2023 19 mmol/L (L)   01/11/2023 21 mmol/L     Iron (ug/dL)   Date Value   12/04/2017 127     Iron Saturation (%)   Date Value   12/04/2017 30     TIBC (ug/dL)   Date Value   12/04/2017 428     Ferritin (ug/L)   Date Value   12/04/2017 97           PAP Adherence  A PAP adherence download was obtained and data was reviewed personally today in clinic.        ASSESSMENT/PLAN     Ms. Garcia is a 48 y.o. female and She was referred to the [facility] for evaluation of JOSE    Problem List, Orders, Assessment, Recommendations:  Problem List Items Addressed This Visit             ICD-10-CM    Excessive daytime sleepiness G47.19    Hypersomnolence G47.10    BMI 27.0-27.9,adult Z68.27     Great progress with weight loss through medication  Encouraged healthy weight loss via diet and exercise going forward.         Obstructive sleep apnea syndrome in adult - Primary G47.33     Good compliance  Lowered pressure to 8-14 cwp  Refitted her with N30i (S cushion)  She did very well         Insufficient sleep syndrome F51.12     I think this is the main cause of her symptom.  She simply needs to sleep more.  4-5 hours of consistent sleep causing chronic sleep deprivation is likely why she is feeling so sleepy  Discussed at length of the important of sufficient sleep.          Other Visit Diagnoses         Codes    JOSE (obstructive sleep apnea)     G47.33    Relevant Orders    Positive Airway  Pressure (PAP) Therapy            Disposition    Return to clinic in 3-4 months

## 2024-03-09 NOTE — ASSESSMENT & PLAN NOTE
Great progress with weight loss through medication  Encouraged healthy weight loss via diet and exercise going forward.

## 2024-03-09 NOTE — ASSESSMENT & PLAN NOTE
Good compliance  Lowered pressure to 8-14 cwp  Refitted her with N30i (S cushion)  She did very well

## 2024-03-09 NOTE — ASSESSMENT & PLAN NOTE
I think this is the main cause of her symptom.  She simply needs to sleep more.  4-5 hours of consistent sleep causing chronic sleep deprivation is likely why she is feeling so sleepy  Discussed at length of the important of sufficient sleep.

## 2024-03-25 PROCEDURE — RXMED WILLOW AMBULATORY MEDICATION CHARGE

## 2024-03-26 ENCOUNTER — PHARMACY VISIT (OUTPATIENT)
Dept: PHARMACY | Facility: CLINIC | Age: 49
End: 2024-03-26
Payer: COMMERCIAL

## 2024-04-05 ENCOUNTER — APPOINTMENT (OUTPATIENT)
Dept: RADIOLOGY | Facility: CLINIC | Age: 49
End: 2024-04-05
Payer: COMMERCIAL

## 2024-04-05 ENCOUNTER — TELEMEDICINE (OUTPATIENT)
Dept: PRIMARY CARE | Facility: CLINIC | Age: 49
End: 2024-04-05
Payer: COMMERCIAL

## 2024-04-05 DIAGNOSIS — M79.7 FIBROMYALGIA: ICD-10-CM

## 2024-04-05 DIAGNOSIS — B00.9 HERPES: ICD-10-CM

## 2024-04-05 DIAGNOSIS — M54.50 LOW BACK PAIN, UNSPECIFIED BACK PAIN LATERALITY, UNSPECIFIED CHRONICITY, UNSPECIFIED WHETHER SCIATICA PRESENT: ICD-10-CM

## 2024-04-05 DIAGNOSIS — I10 HYPERTENSION, UNSPECIFIED TYPE: Primary | ICD-10-CM

## 2024-04-05 DIAGNOSIS — E78.5 HYPERLIPIDEMIA, UNSPECIFIED HYPERLIPIDEMIA TYPE: ICD-10-CM

## 2024-04-05 DIAGNOSIS — F32.A DEPRESSION, UNSPECIFIED DEPRESSION TYPE: ICD-10-CM

## 2024-04-05 PROBLEM — D68.52 PROTHROMBIN GENE MUTATION (MULTI): Status: RESOLVED | Noted: 2023-10-05 | Resolved: 2024-04-05

## 2024-04-05 PROCEDURE — 99214 OFFICE O/P EST MOD 30 MIN: CPT | Performed by: STUDENT IN AN ORGANIZED HEALTH CARE EDUCATION/TRAINING PROGRAM

## 2024-04-05 PROCEDURE — 3008F BODY MASS INDEX DOCD: CPT | Performed by: STUDENT IN AN ORGANIZED HEALTH CARE EDUCATION/TRAINING PROGRAM

## 2024-04-05 PROCEDURE — 1036F TOBACCO NON-USER: CPT | Performed by: STUDENT IN AN ORGANIZED HEALTH CARE EDUCATION/TRAINING PROGRAM

## 2024-04-05 RX ORDER — DULOXETIN HYDROCHLORIDE 60 MG/1
60 CAPSULE, DELAYED RELEASE ORAL DAILY
Qty: 90 CAPSULE | Refills: 1 | Status: SHIPPED | OUTPATIENT
Start: 2024-04-05 | End: 2024-10-02

## 2024-04-05 RX ORDER — ROSUVASTATIN CALCIUM 10 MG/1
10 TABLET, COATED ORAL EVERY 24 HOURS
Qty: 90 TABLET | Refills: 1 | Status: SHIPPED | OUTPATIENT
Start: 2024-04-05

## 2024-04-05 RX ORDER — VALACYCLOVIR HYDROCHLORIDE 500 MG/1
500 TABLET, FILM COATED ORAL DAILY
Qty: 90 TABLET | Refills: 1 | Status: SHIPPED | OUTPATIENT
Start: 2024-04-05

## 2024-04-05 RX ORDER — CELECOXIB 200 MG/1
200 CAPSULE ORAL 2 TIMES DAILY
Qty: 60 CAPSULE | Refills: 5 | Status: SHIPPED | OUTPATIENT
Start: 2024-04-05 | End: 2024-10-02

## 2024-04-05 NOTE — PROGRESS NOTES
Subjective   Patient ID: Fani Garcia is a 48 y.o. female who presents for medication refill.  Today she is accompanied by alone.     HPI  1. 6-month follow-up/medication refills  Overall she is doing well  Requests a virtual visit due to her schedule and now working at the [facility] within the [facility]     2. Weight management  At last appointment was,171 Ibs, 90+ Ib weight loss  Today she is at 160 lbs, lost 100 pounds total  Current weight-loss management plan includes medical therapy of Ozempic and Qsymia, well-tolerated, prescribed by bariatric surgery  Follows-up with Dr. Briana Figueroa (Bariatric Surgery)     3. Depression/Fibromyalgia  Well-controlled with current medication regiment of Duloxetine 60 mg oral tabs daily  Denies of any recent depression or generalized pain symptoms  Requesting refills     4. GERD  Currently on Omeprazole 20 mg tabs daily  Overall feels stable  Gets refills from her weight loss specialty providers     5. Hyperlipidemia  Currently managed with medication regiment of Rosuvastatin 10 mg oral tabs taken at night  Denies of any recent myalgias or cardiopulmonary symptoms  Lipid panel from 12/28/23 was normal  Requesting refills     6. Hypertension  Managed OFF anti-hypertensive medication regiment  States that, due to the recent weight loss, has discontinued her anti-hypertensive medication regiment  Measures blood-pressure at home with home BP cuff, usually 110/60 at home  Gets hypotensive if she is not hydrated  Denies any cardiopulmonary symptoms     7. Herpes  Manages breakthrough symptoms with medication regiment of Valacyclovir 500 mg oral tabs  Requesting medication refill  Not currently experiencing a herpetic oral flare  Denies of any recent  symptoms     8. Lower back pain  Follows-up with Dr. Bony Cruz (Orthopedic Surgeon)  Had surgery last year February (L4-L5 laminectomy), was good for about 6 months then pain started back  again  Unfortunately some increase signs/symptoms occurred  She can't stand for longer than 5 minutes without pain  Also can't go up the stairs at the end of the night, pain in her legs/thighs  Her pain is currently the same as it was prior to surgery  She followed up with Dr. Cruz and he stated that not much else can be done  She currently uses gabapentin 100 mg 2x a day, makes her fatigue, prescribed by bariatrics  Went to physical therapy 2x and did not have a good experience  She has also seen pain management for injections in December which helped but she fell on black ice beginning of  at work  She then had a CT scan to make sure hardware was fine and it was  Requesting to see another orthopedic surgeon for another opinion    Current Outpatient Medications on File Prior to Visit   Medication Sig Dispense Refill    aspirin 81 mg EC tablet Take 1 tablet (81 mg) by mouth once daily.      [] celecoxib (CeleBREX) 200 mg capsule Take 1 capsule (200 mg) by mouth 2 times a day. 60 capsule 5    cholecalciferol (Vitamin D-3) 50 MCG (2000 UT) tablet Take 1 tablet (50 mcg) by mouth once daily.      DULoxetine (Cymbalta) 60 mg DR capsule Take 1 capsule (60 mg) by mouth once daily. Do not crush or chew. 90 capsule 1    fluoride, sodium, (PreviDent 5000 Booster Plus) 1.1 % dental paste Use As Directed      gabapentin (Neurontin) 100 mg capsule Take 1 capsule (100 mg) by mouth 3 times a day. 270 capsule 2    norethindrone (Aygestin) 5 mg tablet TAKE 1 TABLET BY MOUTH ONCE DAILY 90 tablet 3    omeprazole (PriLOSEC) 20 mg DR capsule TAKE 1 CAPSULE BY MOUTH EVERY DAY 30 MINUTES BEFORE MORNING MEAL FOR 90 DAYS 90 capsule 1    oxyCODONE (Roxicodone) 5 mg immediate release tablet Take 1 tablet (5 mg) by mouth every 4 hours if needed for severe pain (7 - 10).      phentermine-topiramate (Qsymia) 15-92 mg capsule, ER multiphase 24 hr Take 15-92 mg by mouth once daily. 30 capsule 3    rosuvastatin (Crestor) 10 mg tablet  Take 1 tablet (10 mg) by mouth once every 24 hours. 90 tablet 1    semaglutide 2 mg/dose (8 mg/3 mL) pen injector INJECT 2MG UNDER THE SKIN ONCE WEEKLY 9 mL 2    valACYclovir (Valtrex) 500 mg tablet Take 1 tablet (500 mg) by mouth once daily. 90 tablet 1     No current facility-administered medications on file prior to visit.        Allergies   Allergen Reactions    Lisinopril Nausea And Vomiting and Unknown    Meperidine Hives, Rash and Unknown       Immunization History   Administered Date(s) Administered    Flu vaccine (IIV4), preservative free *Check age/dose* 10/28/2016, 11/10/2023    Moderna SARS-CoV-2 Vaccination 12/31/2020, 02/19/2021, 12/17/2021    Tdap vaccine, age 7 year and older (BOOSTRIX, ADACEL) 07/24/2020         Review of Systems  All pertinent positive symptoms are included in the history of present illness.  All other systems have been reviewed and are negative and noncontributory to this patient's current ailments.     Objective   There were no vitals taken for this visit.  BSA: There is no height or weight on file to calculate BSA.  No visits with results within 1 Month(s) from this visit.   Latest known visit with results is:   Lab on 12/28/2023   Component Date Value Ref Range Status    Thyroid Stimulating Hormone 12/28/2023 1.20  0.44 - 3.98 mIU/L Final    Cholesterol 12/28/2023 118  0 - 199 mg/dL Final          Age      Desirable   Borderline High   High     0-19 Y     0 - 169       170 - 199     >/= 200    20-24 Y     0 - 189       190 - 224     >/= 225         >24 Y     0 - 199       200 - 239     >/= 240   **All ranges are based on fasting samples. Specific   therapeutic targets will vary based on patient-specific   cardiac risk.    Pediatric guidelines reference:Pediatrics 2011, 128(S5).Adult guidelines reference: NCEP ATPIII Guidelines,LINDSEY 2001, 258:2486-97    Venipuncture immediately after or during the administration of Metamizole may lead to falsely low results. Testing should be  performed immediately prior to Metamizole dosing.    HDL-Cholesterol 12/28/2023 49.3  mg/dL Final      Age       Very Low   Low     Normal    High    0-19 Y    < 35      < 40     40-45     ----  20-24 Y    ----     < 40      >45      ----        >24 Y      ----     < 40     40-60      >60      Cholesterol/HDL Ratio 12/28/2023 2.4   Final      Ref Values  Desirable  < 3.4  High Risk  > 5.0    LDL Calculated 12/28/2023 60  <=99 mg/dL Final                                Near   Borderline      AGE      Desirable  Optimal    High     High     Very High     0-19 Y     0 - 109     ---    110-129   >/= 130     ----    20-24 Y     0 - 119     ---    120-159   >/= 160     ----      >24 Y     0 -  99   100-129  130-159   160-189     >/=190      VLDL 12/28/2023 8  0 - 40 mg/dL Final    Triglycerides 12/28/2023 42  0 - 149 mg/dL Final       Age         Desirable   Borderline High   High     Very High   0 D-90 D    19 - 174         ----         ----        ----  91 D- 9 Y     0 -  74        75 -  99     >/= 100      ----    10-19 Y     0 -  89        90 - 129     >/= 130      ----    20-24 Y     0 - 114       115 - 149     >/= 150      ----         >24 Y     0 - 149       150 - 199    200- 499    >/= 500    Venipuncture immediately after or during the administration of Metamizole may lead to falsely low results. Testing should be performed immediately prior to Metamizole dosing.    Non HDL Cholesterol 12/28/2023 69  0 - 149 mg/dL Final          Age       Desirable   Borderline High   High     Very High     0-19 Y     0 - 119       120 - 144     >/= 145    >/= 160    20-24 Y     0 - 149       150 - 189     >/= 190      ----         >24 Y    30 mg/dL above LDL Cholesterol goal      Glucose 12/28/2023 81  74 - 99 mg/dL Final    Sodium 12/28/2023 138  136 - 145 mmol/L Final    Potassium 12/28/2023 4.0  3.5 - 5.3 mmol/L Final    Chloride 12/28/2023 108 (H)  98 - 107 mmol/L Final    Bicarbonate 12/28/2023 24  21 - 32 mmol/L Final     Anion Gap 12/28/2023 10  10 - 20 mmol/L Final    Urea Nitrogen 12/28/2023 15  6 - 23 mg/dL Final    Creatinine 12/28/2023 1.01  0.50 - 1.05 mg/dL Final    eGFR 12/28/2023 68  >60 mL/min/1.73m*2 Final    Calculations of estimated GFR are performed using the 2021 CKD-EPI Study Refit equation without the race variable for the IDMS-Traceable creatinine methods.  https://jasn.asnjournals.org/content/early/2021/09/22/ASN.8352308267    Calcium 12/28/2023 9.2  8.6 - 10.3 mg/dL Final    Albumin 12/28/2023 3.8  3.4 - 5.0 g/dL Final    Alkaline Phosphatase 12/28/2023 70  33 - 110 U/L Final    Total Protein 12/28/2023 6.4  6.4 - 8.2 g/dL Final    AST 12/28/2023 16  9 - 39 U/L Final    Bilirubin, Total 12/28/2023 1.1  0.0 - 1.2 mg/dL Final    ALT 12/28/2023 19  7 - 45 U/L Final    Patients treated with Sulfasalazine may generate falsely decreased results for ALT.    WBC 12/28/2023 7.3  4.4 - 11.3 x10*3/uL Final    nRBC 12/28/2023 0.0  0.0 - 0.0 /100 WBCs Final    RBC 12/28/2023 5.03  4.00 - 5.20 x10*6/uL Final    Hemoglobin 12/28/2023 14.8  12.0 - 16.0 g/dL Final    Hematocrit 12/28/2023 46.7 (H)  36.0 - 46.0 % Final    MCV 12/28/2023 93  80 - 100 fL Final    MCH 12/28/2023 29.4  26.0 - 34.0 pg Final    MCHC 12/28/2023 31.7 (L)  32.0 - 36.0 g/dL Final    RDW 12/28/2023 13.9  11.5 - 14.5 % Final    Platelets 12/28/2023 342  150 - 450 x10*3/uL Final    Neutrophils % 12/28/2023 64.1  40.0 - 80.0 % Final    Immature Granulocytes %, Automated 12/28/2023 0.4  0.0 - 0.9 % Final    Immature Granulocyte Count (IG) includes promyelocytes, myelocytes and metamyelocytes but does not include bands. Percent differential counts (%) should be interpreted in the context of the absolute cell counts (cells/UL).    Lymphocytes % 12/28/2023 27.7  13.0 - 44.0 % Final    Monocytes % 12/28/2023 6.9  2.0 - 10.0 % Final    Eosinophils % 12/28/2023 0.6  0.0 - 6.0 % Final    Basophils % 12/28/2023 0.3  0.0 - 2.0 % Final    Neutrophils Absolute 12/28/2023  4.65  1.20 - 7.70 x10*3/uL Final    Percent differential counts (%) should be interpreted in the context of the absolute cell counts (cells/uL).    Immature Granulocytes Absolute, Au* 12/28/2023 0.03  0.00 - 0.70 x10*3/uL Final    Lymphocytes Absolute 12/28/2023 2.01  1.20 - 4.80 x10*3/uL Final    Monocytes Absolute 12/28/2023 0.50  0.10 - 1.00 x10*3/uL Final    Eosinophils Absolute 12/28/2023 0.04  0.00 - 0.70 x10*3/uL Final    Basophils Absolute 12/28/2023 0.02  0.00 - 0.10 x10*3/uL Final    Hemoglobin A1C 12/28/2023 5.2  see below % Final    Estimated Average Glucose 12/28/2023 103  Not Established mg/dL Final       Physical Exam  CONSTITUTIONAL - well nourished, well developed, looks like stated age, in no acute distress, not ill-appearing, and not tired appearing  SKIN - normal skin color and pigmentation, normal skin turgor without rash, lesions, or nodules visualized  HEAD - no trauma, normocephalic  EYES - normal external exam  CHEST -no distressed breathing, good effort  EXTREMITIES - no edema, no deformities  NEUROLOGICAL - normal balance, normal motor, no ataxia  PSYCHIATRIC - alert, pleasant and cordial, age-appropriate    Assessment/Plan   1. 6-month follow-up/medication refills  Patient is doing well overall and her medications are well-tolerated     2. Weight management  I am pleased to hear of your successful weight-loss, currently 160 Ibs, 100 Ib weight loss  Please continue your current medication regiment as it is well-tolerated and performing appropriately  Continue close follow-up with Dr. Briana Figueroa (Bariatric Surgery) and the weight-loss clinic.     3. Depression/Fibromyalgia  Please continue Duloxetine 60 mg oral tabs daily  No changes recommended at this time   Stable  Refills sent to your pharmacy     4. GERD  Continue Omeprazole 20 mg tabs daily for symptom management.     5. Hyperlipidemia  Continue Rosuvastatin 10 mg oral tabs  Refills sent to your pharmacy     6. Hypertension  We  recently discontinued your anti-hypertensive medication regimen as home blood-pressure measurements have been normotensive, associated with recent weight-loss  Please continue measuring your blood-pressures at home  Blood-pressure goal of 130/80, ideally 120/80     7. Herpes  Manages breakthrough symptoms with medication regiment of Valacyclovir 500 mg oral tabs  Requesting medication refill  Not currently experiencing a herpetic oral flare     8. Lower back pain  I have sent in a referral for you to see Dr. Freeman  Please schedule an appointment with him at your earliest convenience     Please follow-up for your yearly physical examination in 6 months for continued care or on an as-needed basis    No

## 2024-04-11 ENCOUNTER — OFFICE VISIT (OUTPATIENT)
Dept: OPHTHALMOLOGY | Facility: CLINIC | Age: 49
End: 2024-04-11
Payer: COMMERCIAL

## 2024-04-11 DIAGNOSIS — H16.223 KERATOCONJUNCTIVITIS SICCA OF BOTH EYES NOT SPECIFIED AS SJOGREN'S: ICD-10-CM

## 2024-04-11 DIAGNOSIS — H52.13 MYOPIA, BILATERAL: Primary | ICD-10-CM

## 2024-04-11 DIAGNOSIS — H52.223 REGULAR ASTIGMATISM OF BOTH EYES: ICD-10-CM

## 2024-04-11 DIAGNOSIS — H52.4 PRESBYOPIA OF BOTH EYES: ICD-10-CM

## 2024-04-11 PROCEDURE — FLVLF CONTACT LENS EVALUATION (SP): Performed by: OPTOMETRIST

## 2024-04-11 PROCEDURE — V2599 CONTACT LENS/ES OTHER TYPE: HCPCS | Performed by: OPTOMETRIST

## 2024-04-11 PROCEDURE — 92014 COMPRE OPH EXAM EST PT 1/>: CPT | Performed by: OPTOMETRIST

## 2024-04-11 PROCEDURE — 92015 DETERMINE REFRACTIVE STATE: CPT | Performed by: OPTOMETRIST

## 2024-04-11 ASSESSMENT — REFRACTION_MANIFEST
OD_CYLINDER: -1.25
OD_SPHERE: -2.50
OS_AXIS: 015
OS_CYLINDER: -0.75
OS_SPHERE: -2.75
OD_AXIS: 180
OS_ADD: +2.25
OD_ADD: +2.25

## 2024-04-11 ASSESSMENT — REFRACTION_WEARINGRX
OS_AXIS: 170
OS_CYLINDER: -1.25
OD_ADD: +2.25
OD_AXIS: 180
OS_ADD: +2.25
OD_CYLINDER: -1.25
OD_SPHERE: -2.25
OS_SPHERE: -2.50

## 2024-04-11 ASSESSMENT — CONF VISUAL FIELD
OS_SUPERIOR_NASAL_RESTRICTION: 0
OS_INFERIOR_TEMPORAL_RESTRICTION: 0
OS_SUPERIOR_TEMPORAL_RESTRICTION: 0
OD_SUPERIOR_NASAL_RESTRICTION: 0
METHOD: COUNTING FINGERS
OD_INFERIOR_TEMPORAL_RESTRICTION: 0
OS_INFERIOR_NASAL_RESTRICTION: 0
OD_SUPERIOR_TEMPORAL_RESTRICTION: 0
OS_NORMAL: 1
OD_NORMAL: 1
OD_INFERIOR_NASAL_RESTRICTION: 0

## 2024-04-11 ASSESSMENT — CUP TO DISC RATIO
OS_RATIO: .3
OD_RATIO: .3

## 2024-04-11 ASSESSMENT — REFRACTION_CURRENTRX
OS_BASECURVE: 8.2
OS_CYLINDER: SPHERE
OS_DIAMETER: 14.5
OD_BASECURVE: 8.3
OD_SPHERE: -2.25
OD_ADD: +1.75
OS_DIAMETER: 14.5
OD_CYLINDER: SPHERE
OD_DIAMETER: 14.5
OS_CYLINDER: SPHERE
OD_BASECURVE: 8.3
OD_CYLINDER: SPHERE
OD_SPHERE: -2.25
OD_DIAMETER: 14.5
OS_SPHERE: -2.75
OS_SPHERE: -2.75
OS_BASECURVE: 8.2
OS_ADD: +2.50

## 2024-04-11 ASSESSMENT — VISUAL ACUITY
OS_CC: 20/25+2
VA_OR_OD_CURRENT_RX: 20/20
METHOD: SNELLEN - LINEAR
VA_OR_OD_CURRENT_RX: 20/20
VA_OR_OS_CURRENT_RX: 20/25+2
OD_CC: 20/20
VA_OR_OS_CURRENT_RX: 20/25+2
OS_CC+: +2
CORRECTION_TYPE: CONTACTS
OD_CC+: -2
OS_CC: 20/30

## 2024-04-11 ASSESSMENT — SLIT LAMP EXAM - LIDS
COMMENTS: GOOD POSITION
COMMENTS: GOOD POSITION

## 2024-04-11 ASSESSMENT — ENCOUNTER SYMPTOMS
PSYCHIATRIC NEGATIVE: 0
EYES NEGATIVE: 0
HEMATOLOGIC/LYMPHATIC NEGATIVE: 0
CONSTITUTIONAL NEGATIVE: 0
ALLERGIC/IMMUNOLOGIC NEGATIVE: 0
CARDIOVASCULAR NEGATIVE: 0
MUSCULOSKELETAL NEGATIVE: 0
ENDOCRINE NEGATIVE: 0
RESPIRATORY NEGATIVE: 0
GASTROINTESTINAL NEGATIVE: 0
NEUROLOGICAL NEGATIVE: 0

## 2024-04-11 ASSESSMENT — TONOMETRY
OS_IOP_MMHG: 12
IOP_METHOD: GOLDMANN APPLANATION
OD_IOP_MMHG: 12

## 2024-04-11 ASSESSMENT — EXTERNAL EXAM - RIGHT EYE: OD_EXAM: NORMAL

## 2024-04-11 ASSESSMENT — EXTERNAL EXAM - LEFT EYE: OS_EXAM: NORMAL

## 2024-04-11 NOTE — Clinical Note
Both eyes (OU) Contact Lens Order  Quantity: 2 Package: RGP Appointment needed? No Medically necessary? No Ship To: Home Additional instructions: Please order update Rx (OS changed) of duette progressive and mail to patient. If patient likes, will order completion set. Charges in Epic. Thanks!

## 2024-04-11 NOTE — PROGRESS NOTES
Assessment/Plan   Diagnoses and all orders for this visit:  Myopia, bilateral  Regular astigmatism of both eyes  Presbyopia of both eyes  New spec rx  and clrx released today per patient request. Discussed protein build up on lenses, especially when nearing 6 month replacement period. Recommend clear care to store lenses and instructed patient on proper use.  Will order new Clrx #2 to be mailed to patient home, pt. Will call if patient likes to order completion set, if not well switch back. Discussed possible compromise in near.   Discussed proper wear, care, replacement of contact lenses. Gave handout. D/c cl wear and RTC if eyes become red, painful, irritated. Monitor 1 year.   CL eval billed today. $35    Keratoconjunctivitis sicca of both eyes not specified as Sjogren's  Pt is asymptomatic. Continue to monitor.

## 2024-04-12 ENCOUNTER — APPOINTMENT (OUTPATIENT)
Dept: RADIOLOGY | Facility: CLINIC | Age: 49
End: 2024-04-12
Payer: COMMERCIAL

## 2024-04-26 ENCOUNTER — HOSPITAL ENCOUNTER (OUTPATIENT)
Dept: RADIOLOGY | Facility: CLINIC | Age: 49
Discharge: HOME | End: 2024-04-26
Payer: COMMERCIAL

## 2024-04-26 ENCOUNTER — OFFICE VISIT (OUTPATIENT)
Dept: ORTHOPEDIC SURGERY | Facility: CLINIC | Age: 49
End: 2024-04-26
Payer: COMMERCIAL

## 2024-04-26 VITALS — WEIGHT: 165 LBS | BODY MASS INDEX: 28.17 KG/M2 | HEIGHT: 64 IN

## 2024-04-26 VITALS — HEIGHT: 64 IN | WEIGHT: 160 LBS | BODY MASS INDEX: 27.31 KG/M2

## 2024-04-26 DIAGNOSIS — M54.16 LUMBAR RADICULOPATHY: ICD-10-CM

## 2024-04-26 DIAGNOSIS — M54.50 LOW BACK PAIN, UNSPECIFIED BACK PAIN LATERALITY, UNSPECIFIED CHRONICITY, UNSPECIFIED WHETHER SCIATICA PRESENT: ICD-10-CM

## 2024-04-26 DIAGNOSIS — M96.1 LUMBAR POST-LAMINECTOMY SYNDROME: Primary | ICD-10-CM

## 2024-04-26 DIAGNOSIS — Z12.31 ENCOUNTER FOR SCREENING MAMMOGRAM FOR MALIGNANT NEOPLASM OF BREAST: ICD-10-CM

## 2024-04-26 PROCEDURE — 77063 BREAST TOMOSYNTHESIS BI: CPT | Mod: BILATERAL PROCEDURE | Performed by: RADIOLOGY

## 2024-04-26 PROCEDURE — 77067 SCR MAMMO BI INCL CAD: CPT

## 2024-04-26 PROCEDURE — 77067 SCR MAMMO BI INCL CAD: CPT | Mod: BILATERAL PROCEDURE | Performed by: RADIOLOGY

## 2024-04-26 PROCEDURE — 99214 OFFICE O/P EST MOD 30 MIN: CPT | Performed by: ORTHOPAEDIC SURGERY

## 2024-04-26 PROCEDURE — 3008F BODY MASS INDEX DOCD: CPT | Performed by: ORTHOPAEDIC SURGERY

## 2024-04-26 ASSESSMENT — PAIN - FUNCTIONAL ASSESSMENT: PAIN_FUNCTIONAL_ASSESSMENT: 0-10

## 2024-04-26 NOTE — LETTER
April 29, 2024     Dang Calvert DO  [address]  [facility], [address]  [facility] [address]    Patient: Fani Garcia   YOB: 1975   Date of Visit: 4/26/2024       Dear Dr. Dang Calvert DO:    Thank you for referring Fani Garcia to me for evaluation. Below are my notes for this consultation.  If you have questions, please do not hesitate to call me. I look forward to following your patient along with you.       Sincerely,     Robert Freeman MD      CC: No Recipients  ______________________________________________________________________________________    HPI:Fani Garcia is a very pleasant 48-year-old woman, who is the previous patient of Dr. Bony Cruz and comes in today for second opinion.  She had an L4-5 TLIF done in February 2023.  She did okay for about 6 months and then started getting increasing back pain as well as numbness and tingling into her left lower extremity.  She describes claudication symptoms which make it difficult to stand and walk for any thing more than about 10 minutes.  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  She has completed physical therapy and had steroid injections with no significant improvement.  She comes in today for second opinion because of her ongoing symptoms.  She has not had a follow-up MRI.      ROS:  Reviewed on EMR and patient intake sheet.    PMH/SH:  Reviewed on EMR and patient intake sheet.    Exam:  Physical Exam    Constitutional: Well appearing; no acute distress  Eyes: pupils are equal and round  Psych: normal affect  Respiratory: non-labored breathing  Cardiovascular: regular rate and rhythm  GI: non-distended abdomen  Musculoskeletal: no pain with range of motion of the hips bilaterally  Neurologic: [5]/5 strength in the lower extremities bilaterally]; [negative] straight leg raise    Radiology:     X-rays were personally reviewed.  She has interbody cage and posterior instrumentation L4-L5 with no  obvious hardware failure.    Diagnosis:    Lumbar laminectomy syndrome    Assessment and Plan:   48-year-old woman with a lumbar laminectomy syndrome little over a year after L4-5 TLIF.  Her symptoms have worsened over the course the last 6 months despite physical therapy and injections.  At this time, a follow-up MRI with metal suppression to assess for any residual and or adjacent segment stenosis would be appropriate.  I will see her back after that we can discuss further treatment options.  She was appreciative for the second opinion.    The patient was in agreement with the plan. At the end of the visit today, the patient felt that all questions had been answered satisfactorily.  The patient was pleased with the visit and very appreciative for the care rendered.     Thank you very much for the kind referral.  It is a privilege, and a pleasure, to partner with you in the care of your patients.  I would be delighted to assist you with any further consultations as needed.          Robert Freeman MD    Chief of Spine Surgery, [facility]  Director of Spine Service, [facility]  , Department of Orthopaedics  [employer]  [address] [id number]  P: [phone]  [facility]    This note was dictated with voice recognition software.  It has not been proofread for grammatical errors, typographical mistakes or other semantic inconsistencies.

## 2024-04-29 NOTE — PROGRESS NOTES
HPI:Fani Garcia is a very pleasant 48-year-old woman, who is the previous patient of Dr. Bony Cruz and comes in today for second opinion.  She had an L4-5 TLIF done in February 2023.  She did okay for about 6 months and then started getting increasing back pain as well as numbness and tingling into her left lower extremity.  She describes claudication symptoms which make it difficult to stand and walk for any thing more than about 10 minutes.  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  She has completed physical therapy and had steroid injections with no significant improvement.  She comes in today for second opinion because of her ongoing symptoms.  She has not had a follow-up MRI.      ROS:  Reviewed on EMR and patient intake sheet.    PMH/SH:  Reviewed on EMR and patient intake sheet.    Exam:  Physical Exam    Constitutional: Well appearing; no acute distress  Eyes: pupils are equal and round  Psych: normal affect  Respiratory: non-labored breathing  Cardiovascular: regular rate and rhythm  GI: non-distended abdomen  Musculoskeletal: no pain with range of motion of the hips bilaterally  Neurologic: [5]/5 strength in the lower extremities bilaterally]; [negative] straight leg raise    Radiology:     X-rays were personally reviewed.  She has interbody cage and posterior instrumentation L4-L5 with no obvious hardware failure.    Diagnosis:    Lumbar laminectomy syndrome    Assessment and Plan:   48-year-old woman with a lumbar laminectomy syndrome little over a year after L4-5 TLIF.  Her symptoms have worsened over the course the last 6 months despite physical therapy and injections.  At this time, a follow-up MRI with metal suppression to assess for any residual and or adjacent segment stenosis would be appropriate.  I will see her back after that we can discuss further treatment options.  She was appreciative for the second opinion.    The patient was in agreement with the plan. At the end of the  visit today, the patient felt that all questions had been answered satisfactorily.  The patient was pleased with the visit and very appreciative for the care rendered.     Thank you very much for the kind referral.  It is a privilege, and a pleasure, to partner with you in the care of your patients.  I would be delighted to assist you with any further consultations as needed.          Robert Freeman MD    Chief of Spine Surgery, [facility]  Director of Spine Service, [facility]  , Department of Orthopaedics  [employer] [facility] [employer]  [address] [id number]  P: [phone]  [facility]    This note was dictated with voice recognition software.  It has not been proofread for grammatical errors, typographical mistakes or other semantic inconsistencies.

## 2024-04-30 ENCOUNTER — APPOINTMENT (OUTPATIENT)
Dept: PAIN MEDICINE | Facility: CLINIC | Age: 49
End: 2024-04-30
Payer: COMMERCIAL

## 2024-04-30 RX ORDER — OXYCODONE HYDROCHLORIDE 5 MG/1
5 TABLET ORAL EVERY 4 HOURS PRN
Qty: 15 TABLET | OUTPATIENT
Start: 2024-04-30

## 2024-04-30 NOTE — PROGRESS NOTES
Subjective   Patient ID: Fani Garcia is a 48 y.o. female who presents for Pain and Weight Management.    HPI 48-year-old female with a history of obesity and associated comorbidities of Hypertension, Hyperlipidemia, Joint Pain, Obstructive sleep apnea and Vitamin-D deficiency. Of note, Fani underwent an L4-L5 MAS TLIF with Dr. Federico Cruz on February 21, 2023. Fani had been doing relatively well in her recovery but, unfortunately, she has had a recurrence of the back and radicular leg pain she was having before surgery. In addition, she started having significant right hip pain for which she received an intra-articular joint injection by Dr. Pompa on 11/14/2023. Fani reports her hip pain has significantly improved since her injection. In addition, she underwent a Caudal injection with Dr. Pompa on 12/8/2023. She is still using the Gabapentin for the back and radicular leg pain. She is unable to take it TID during the work week as it further increases her daytime somnolence. She also utilizes Percocet infrequently, however she has been relying on it more recently as her pain has continued to worsen.  She is hoping to repeat the caudal epidural steroid injection as well as a left hip trochanteric bursa injection.     Fani also is being seen today for refills on her anti-obesity medications: Qsymia and Ozempic. She has been doing very well with weight loss; almost down 100lbs since our first visit! She denies nausea vomiting diarrhea constipation or abdominal pain with the use of Ozempic. She denies adverse effects from Qsymia.     Weight Hx:  Initial Weight: 255  Last Visit Weight: 160  Current Weight: 161  Total Loss: 94lbs     Diet Recall:  Breakfast:  [employer]  Lunch: Side of mac & cheese   Dinner: Breaded chicken and potatoes  Snack(s): Apple     Current exercise routine: None 2/2 pain.     Review of Systems Unless noted in the HPI all other systems have been reviewed and are negative for  complaint.    Objective   Physical Exam Telehealth visit  General- No acute distress, well appearing and well nourished.   Eyes Conjunctiva and lids: No erythema, swelling or discharge  Pulmonary - Respiratory effort: Normal respiration.   Neurologic - Coordination: Normal. A&Ox3.  Psychiatric - Orientation to person, place, and time: Normal. Mood and affect: Normal.    Assessment/Plan   Problem List Items Addressed This Visit       Insulin resistance    Relevant Medications    phentermine-topiramate (Qsymia) 15-92 mg capsule, ER multiphase 24 hr    semaglutide 2 mg/dose (8 mg/3 mL) pen injector (Start on 5/5/2024)    Metabolic syndrome    Relevant Medications    phentermine-topiramate (Qsymia) 15-92 mg capsule, ER multiphase 24 hr    semaglutide 2 mg/dose (8 mg/3 mL) pen injector (Start on 5/5/2024)    BMI 27.0-27.9,adult    Polyphagia    Relevant Medications    phentermine-topiramate (Qsymia) 15-92 mg capsule, ER multiphase 24 hr    Prediabetes    Relevant Medications    phentermine-topiramate (Qsymia) 15-92 mg capsule, ER multiphase 24 hr    semaglutide 2 mg/dose (8 mg/3 mL) pen injector (Start on 5/5/2024)    S/P lumbar fusion    Overweight (BMI 25.0-29.9)    Relevant Medications    phentermine-topiramate (Qsymia) 15-92 mg capsule, ER multiphase 24 hr    Failed back syndrome - Primary    Relevant Medications    oxyCODONE-acetaminophen (Percocet) 5-325 mg tablet    Lumbar radiculopathy    Relevant Medications    oxyCODONE-acetaminophen (Percocet) 5-325 mg tablet    Dietary counseling    Exercise counseling     TREATMENT PLAN:  I had a nice discussion with the patient today and our plan will be as follows:  Radiology: No new imaging to review at this time.   Physically: Encouraged patient to continue with increased physical activity as able.   Psychologically: No acute psychological needs at this time.    Medication: After discussing with collaborating physician Dr. Leander Pompa, I will refill the patient's  opioids today for [14 ] days.  The patient continues to see benefit and improvement in their quality of life and ability to maintain ADLs. Patient educated about the risks of taking opioids and operating a motor vehicle. Patient reports no adverse side effects to current medication regimen.  Current regimen does allow patient to maintain ADLs.  Patient reports no new neurologic symptoms, new pain areas, or exacerbation in pain today.  Patient reports they are happy with current treatment care path.  Patient has been educated on the risks, benefits, and alternatives of controlled substances as well as the proper way to store these medications. The patient and I discussed the nature of this medication and its side effects.  We discussed tolerance, physical dependence, psychological dependence, addiction and opioid-induced hyperalgesia.  We discussed the potential need to wean from this medication.  We discussed the availability of programs that can help with this process if necessary.  We discussed safety issues related to opioids including safe storage.  We discussed the fact that the patient should not drive an automobile or operate heavy machinery while taking this medication.  A prescription for naloxone was offered to the patient.  The patient will be re-evaluated for the need to continue opioid therapy in 60-90 days.  A PDMP report was reviewed today and was consistent with reported prescribing.  Will follow-up in 2 weeks.  If opioid therapy is going to be chronic, will have patient come in to sign CSA as well as submit a specimen for UDS/toxicology screening.  Duration: >1 year   Intervention: Patient will come to the office tomorrow for a left trochanteric bursa injection.  Will also schedule a caudal epidural steroid injection to be performed under fluoroscopic guidance with Dr. Leander Pompa.   Obesity: Patient has lost 94 pounds.  Current BMI is 27.  Will continue Qsymia and Ozempic at current doses.  Will  follow-up on obesity medications in 12 weeks or sooner as needed.

## 2024-05-01 ENCOUNTER — TELEMEDICINE (OUTPATIENT)
Dept: PAIN MEDICINE | Facility: CLINIC | Age: 49
End: 2024-05-01
Payer: COMMERCIAL

## 2024-05-01 VITALS — WEIGHT: 160 LBS | HEIGHT: 64 IN | BODY MASS INDEX: 27.31 KG/M2

## 2024-05-01 DIAGNOSIS — Z71.82 EXERCISE COUNSELING: ICD-10-CM

## 2024-05-01 DIAGNOSIS — E66.3 OVERWEIGHT (BMI 25.0-29.9): ICD-10-CM

## 2024-05-01 DIAGNOSIS — E88.819 INSULIN RESISTANCE: ICD-10-CM

## 2024-05-01 DIAGNOSIS — E88.810 METABOLIC SYNDROME: ICD-10-CM

## 2024-05-01 DIAGNOSIS — R63.2 POLYPHAGIA: ICD-10-CM

## 2024-05-01 DIAGNOSIS — Z98.1 S/P LUMBAR FUSION: ICD-10-CM

## 2024-05-01 DIAGNOSIS — M54.16 LUMBAR RADICULOPATHY: ICD-10-CM

## 2024-05-01 DIAGNOSIS — R73.03 PREDIABETES: ICD-10-CM

## 2024-05-01 DIAGNOSIS — M70.62 TROCHANTERIC BURSITIS OF LEFT HIP: ICD-10-CM

## 2024-05-01 DIAGNOSIS — Z71.3 DIETARY COUNSELING: ICD-10-CM

## 2024-05-01 DIAGNOSIS — M96.1 FAILED BACK SYNDROME: Primary | ICD-10-CM

## 2024-05-01 PROCEDURE — 99401 PREV MED CNSL INDIV APPRX 15: CPT | Performed by: NURSE PRACTITIONER

## 2024-05-01 PROCEDURE — 3008F BODY MASS INDEX DOCD: CPT | Performed by: NURSE PRACTITIONER

## 2024-05-01 PROCEDURE — 1036F TOBACCO NON-USER: CPT | Performed by: NURSE PRACTITIONER

## 2024-05-01 PROCEDURE — RXMED WILLOW AMBULATORY MEDICATION CHARGE

## 2024-05-01 PROCEDURE — 99214 OFFICE O/P EST MOD 30 MIN: CPT | Performed by: NURSE PRACTITIONER

## 2024-05-01 RX ORDER — OXYCODONE AND ACETAMINOPHEN 5; 325 MG/1; MG/1
1 TABLET ORAL EVERY 12 HOURS PRN
Qty: 28 TABLET | Refills: 0 | Status: SHIPPED | OUTPATIENT
Start: 2024-05-01 | End: 2024-05-15

## 2024-05-01 RX ORDER — PHENTERMINE AND TOPIRAMATE 15; 92 MG/1; MG/1
15-92 CAPSULE, EXTENDED RELEASE ORAL DAILY
Qty: 30 CAPSULE | Refills: 3 | Status: SHIPPED | OUTPATIENT
Start: 2024-05-01 | End: 2024-05-31

## 2024-05-01 ASSESSMENT — PAIN SCALES - GENERAL: PAINLEVEL: 5

## 2024-05-02 ENCOUNTER — APPOINTMENT (OUTPATIENT)
Dept: OPHTHALMOLOGY | Facility: CLINIC | Age: 49
End: 2024-05-02
Payer: COMMERCIAL

## 2024-05-03 ENCOUNTER — PHARMACY VISIT (OUTPATIENT)
Dept: PHARMACY | Facility: CLINIC | Age: 49
End: 2024-05-03
Payer: COMMERCIAL

## 2024-05-03 ENCOUNTER — OFFICE VISIT (OUTPATIENT)
Dept: OPHTHALMOLOGY | Facility: CLINIC | Age: 49
End: 2024-05-03
Payer: COMMERCIAL

## 2024-05-03 DIAGNOSIS — H52.13 MYOPIA, BILATERAL: ICD-10-CM

## 2024-05-03 DIAGNOSIS — H52.4 PRESBYOPIA OF BOTH EYES: ICD-10-CM

## 2024-05-03 DIAGNOSIS — H52.223 REGULAR ASTIGMATISM OF BOTH EYES: Primary | ICD-10-CM

## 2024-05-03 PROCEDURE — FCCLS CONTACT LENS F/U VISIT: Performed by: OPTOMETRIST

## 2024-05-03 ASSESSMENT — REFRACTION_CURRENTRX
OS_CYLINDER: SPHERE
OS_SPHERE: -3.00
OS_CYLINDER: SPHERE
OD_CYLINDER: SPHERE
OS_ADD: +2.50
OD_CYLINDER: SPHERE
OS_SPHERE: -2.75
OD_ADD: +1.75
OD_DIAMETER: 14.5
OD_DIAMETER: 14.5
OS_ADD: +2.50
OD_BASECURVE: 8.3
OS_DIAMETER: 14.5
OD_SPHERE: -2.25
OS_BASECURVE: 8.2
OD_BASECURVE: 8.3
OS_BASECURVE: 8.2
OD_ADD: +1.75
OD_SPHERE: -2.50
OS_DIAMETER: 14.5

## 2024-05-03 ASSESSMENT — ENCOUNTER SYMPTOMS
ENDOCRINE NEGATIVE: 0
CARDIOVASCULAR NEGATIVE: 0
GASTROINTESTINAL NEGATIVE: 0
RESPIRATORY NEGATIVE: 0
NEUROLOGICAL NEGATIVE: 0
PSYCHIATRIC NEGATIVE: 0
CONSTITUTIONAL NEGATIVE: 0
HEMATOLOGIC/LYMPHATIC NEGATIVE: 0
MUSCULOSKELETAL NEGATIVE: 0
EYES NEGATIVE: 0
ALLERGIC/IMMUNOLOGIC NEGATIVE: 0

## 2024-05-03 ASSESSMENT — VISUAL ACUITY: VA_OR_OD_CURRENT_RX: 20/25+2

## 2024-05-03 NOTE — Clinical Note
Both eyes (OU) Contact Lens Order  Quantity: 2 Package: RGP Appointment needed? No Medically necessary? No Ship To: Home Additional instructions: Hello, can you please order the Rx 2 lenses and mail them to the patient- it's a remake under warranty. If she likes the power change we'll ship the completion set of hybrid lenses. Thanks!

## 2024-05-03 NOTE — PROGRESS NOTES
Assessment/Plan   Diagnoses and all orders for this visit:  Regular astigmatism of both eyes  Presbyopia of both eyes  Myopia, bilateral  Order lens remake under warranty and ship to patient. Discussed w/ MF that change in distance has potential to change near. Patient voiced understanding. If lenses are acceptable then will order completion set. Cost of lenses and fit was billed at eval visit.

## 2024-05-09 ENCOUNTER — OFFICE VISIT (OUTPATIENT)
Dept: PAIN MEDICINE | Facility: CLINIC | Age: 49
End: 2024-05-09
Payer: COMMERCIAL

## 2024-05-09 ENCOUNTER — APPOINTMENT (OUTPATIENT)
Dept: PAIN MEDICINE | Facility: CLINIC | Age: 49
End: 2024-05-09
Payer: COMMERCIAL

## 2024-05-09 VITALS
SYSTOLIC BLOOD PRESSURE: 127 MMHG | HEIGHT: 64 IN | DIASTOLIC BLOOD PRESSURE: 70 MMHG | OXYGEN SATURATION: 100 % | BODY MASS INDEX: 27.31 KG/M2 | WEIGHT: 160 LBS | HEART RATE: 87 BPM

## 2024-05-09 DIAGNOSIS — M70.62 GREATER TROCHANTERIC BURSITIS OF LEFT HIP: Primary | ICD-10-CM

## 2024-05-09 PROCEDURE — 2500000004 HC RX 250 GENERAL PHARMACY W/ HCPCS (ALT 636 FOR OP/ED): Performed by: NURSE PRACTITIONER

## 2024-05-09 PROCEDURE — 1036F TOBACCO NON-USER: CPT | Performed by: NURSE PRACTITIONER

## 2024-05-09 PROCEDURE — 2500000005 HC RX 250 GENERAL PHARMACY W/O HCPCS: Performed by: NURSE PRACTITIONER

## 2024-05-09 PROCEDURE — 99213 OFFICE O/P EST LOW 20 MIN: CPT | Performed by: NURSE PRACTITIONER

## 2024-05-09 PROCEDURE — 3008F BODY MASS INDEX DOCD: CPT | Performed by: NURSE PRACTITIONER

## 2024-05-09 RX ORDER — TRIAMCINOLONE ACETONIDE 40 MG/ML
40 INJECTION, SUSPENSION INTRA-ARTICULAR; INTRAMUSCULAR ONCE
Status: COMPLETED | OUTPATIENT
Start: 2024-05-09 | End: 2024-05-09

## 2024-05-09 RX ORDER — LIDOCAINE HYDROCHLORIDE 10 MG/ML
5 INJECTION, SOLUTION EPIDURAL; INFILTRATION; INTRACAUDAL; PERINEURAL ONCE
Status: COMPLETED | OUTPATIENT
Start: 2024-05-09 | End: 2024-05-09

## 2024-05-09 RX ADMIN — TRIAMCINOLONE ACETONIDE 40 MG: 40 INJECTION, SUSPENSION INTRA-ARTICULAR; INTRAMUSCULAR at 15:36

## 2024-05-09 RX ADMIN — LIDOCAINE HYDROCHLORIDE 50 MG: 10 SOLUTION INTRAVENOUS at 15:34

## 2024-05-09 ASSESSMENT — PAIN SCALES - GENERAL
PAINLEVEL: 8
PAINLEVEL_OUTOF10: 0 - NO PAIN
PAINLEVEL_OUTOF10: 8

## 2024-05-09 ASSESSMENT — PATIENT HEALTH QUESTIONNAIRE - PHQ9
SUM OF ALL RESPONSES TO PHQ9 QUESTIONS 1 AND 2: 0
1. LITTLE INTEREST OR PLEASURE IN DOING THINGS: NOT AT ALL
2. FEELING DOWN, DEPRESSED OR HOPELESS: NOT AT ALL

## 2024-05-09 ASSESSMENT — PAIN - FUNCTIONAL ASSESSMENT: PAIN_FUNCTIONAL_ASSESSMENT: 0-10

## 2024-05-09 ASSESSMENT — PAIN DESCRIPTION - DESCRIPTORS: DESCRIPTORS: ACHING

## 2024-05-09 NOTE — H&P (VIEW-ONLY)
Subjective   Patient ID: Fani Garcia is a 48 y.o. female who presents for Hip injection.    HPI 48-year-old female with trochanteric bursitis of the left hip. She presents today for an intra-articular hip injection. She did have her right hip injected with Dr. Pompa in November and has had a good response since. Unsure if aggravated from gait compensation 2/2 her ongoing LBP or arthritic changes but she continues to suffer with left hip pain. Fani has done multiple rounds of physical therapy as well as physician supervised physical activity regimens. She has failed conservative treatment and has benefited from injections in the past. She currently has point tenderness over the left greater trochanter. It bothers her when she rolls over in bed at night. She has worsening pain with prolonged standing and walking as well as crossing her legs. Current pain score 8/10.     Review of Systems Unless noted in the HPI all other systems have been reviewed and are negative for complaint.     Objective   Physical Exam  General- No acute distress, well appearing and well nourished. Eyes Conjunctiva and lids: No erythema, swelling or discharge  Neck - Supple, no cervical lymphadenopathy.   Pulmonary - Respiratory effort: Normal respiration.   Cardiovascular - Normal rate and rhythm.  Examination of extremities for edema and/or varicosities: No peripheral edema  Abdomen: Soft, Non-tender, non-distended, no abdominal masses.   Musculoskeletal - Decreased ROM to the lumbar spine. Decreased ROM to the left hip. Point tenderness with palpation to the left hip/bursa.    Skin - Skin and subcutaneous tissue: Normal without rashes or lesions.  Neurologic - Antalgic Gait   Psychiatric - Orientation to person, place, and time: Normal. Mood and affect: Normal.    Assessment/Plan   Problem List Items Addressed This Visit       Greater trochanteric bursitis of left hip - Primary    Relevant Medications    triamcinolone acetonide  (Kenalog-40) injection 40 mg (Completed)    lidocaine PF (Xylocaine) 10 mg/mL (1 %) injection 50 mg (Completed)    Other Relevant Orders    Joint Injection/Aspiration     TREATMENT PLAN:  I had a nice discussion with the patient today and our plan will be as follows:  Radiology: No new imaging to review at this time.   Physically: Encouraged patient to continue with increased physical activity as able.   Psychologically: No acute psychological needs at this time.    Medication: Continue Percocet 5-325mg BID, PRN severe pain.   Duration: >6 months    Intervention: Patient to undergo left intra-articular hip injection today. Awaiting prior auth to schedule caudal LILY with Dr. Pompa to be performed under fluoroscopic guidance.      Procedure:   Patient presents for a Left Trochanteric Bursa injection. The procedure was discussed in detail including risks, benefits and alternatives. The patient signed informed consent for the procedure and wished to proceed. The left bursa was palpated and prepped in typical sterile fashion using alcohol/chlorhexidine. A 22g needle was advanced into the bursa. After confirmation of negative aspiration, 4mL of 1% Lidocane combined with 40mg of Kenalog was injected. The needle was removed without difficulty. Patient tolerated procedure well. Post-injection pain score was a 6/10. Patient left the office without any difficulty.

## 2024-05-10 ENCOUNTER — APPOINTMENT (OUTPATIENT)
Dept: OPHTHALMOLOGY | Facility: CLINIC | Age: 49
End: 2024-05-10
Payer: COMMERCIAL

## 2024-05-10 ENCOUNTER — HOSPITAL ENCOUNTER (OUTPATIENT)
Dept: RADIOLOGY | Facility: HOSPITAL | Age: 49
Discharge: HOME | End: 2024-05-10
Payer: COMMERCIAL

## 2024-05-10 DIAGNOSIS — M54.16 LUMBAR RADICULOPATHY: ICD-10-CM

## 2024-05-10 PROCEDURE — 72148 MRI LUMBAR SPINE W/O DYE: CPT

## 2024-05-10 PROCEDURE — 72148 MRI LUMBAR SPINE W/O DYE: CPT | Performed by: RADIOLOGY

## 2024-05-14 ENCOUNTER — APPOINTMENT (OUTPATIENT)
Dept: PAIN MEDICINE | Facility: CLINIC | Age: 49
End: 2024-05-14
Payer: COMMERCIAL

## 2024-05-24 ENCOUNTER — HOSPITAL ENCOUNTER (OUTPATIENT)
Dept: RADIOLOGY | Facility: HOSPITAL | Age: 49
Discharge: HOME | End: 2024-05-24
Payer: COMMERCIAL

## 2024-05-24 ENCOUNTER — HOSPITAL ENCOUNTER (OUTPATIENT)
Dept: GASTROENTEROLOGY | Facility: HOSPITAL | Age: 49
Discharge: HOME | End: 2024-05-24
Payer: COMMERCIAL

## 2024-05-24 VITALS
HEART RATE: 90 BPM | OXYGEN SATURATION: 100 % | HEIGHT: 64 IN | DIASTOLIC BLOOD PRESSURE: 90 MMHG | BODY MASS INDEX: 27.31 KG/M2 | RESPIRATION RATE: 17 BRPM | TEMPERATURE: 97.7 F | SYSTOLIC BLOOD PRESSURE: 132 MMHG | WEIGHT: 160 LBS

## 2024-05-24 DIAGNOSIS — M54.16 LUMBAR RADICULOPATHY: ICD-10-CM

## 2024-05-24 PROCEDURE — 2550000001 HC RX 255 CONTRASTS: Performed by: ANESTHESIOLOGY

## 2024-05-24 PROCEDURE — 2500000005 HC RX 250 GENERAL PHARMACY W/O HCPCS: Performed by: ANESTHESIOLOGY

## 2024-05-24 PROCEDURE — 2500000004 HC RX 250 GENERAL PHARMACY W/ HCPCS (ALT 636 FOR OP/ED): Performed by: ANESTHESIOLOGY

## 2024-05-24 PROCEDURE — 62323 NJX INTERLAMINAR LMBR/SAC: CPT | Performed by: ANESTHESIOLOGY

## 2024-05-24 PROCEDURE — 7100000009 HC PHASE TWO TIME - INITIAL BASE CHARGE

## 2024-05-24 PROCEDURE — 7100000010 HC PHASE TWO TIME - EACH INCREMENTAL 1 MINUTE

## 2024-05-24 RX ORDER — TRIAMCINOLONE ACETONIDE 40 MG/ML
INJECTION, SUSPENSION INTRA-ARTICULAR; INTRAMUSCULAR AS NEEDED
Status: COMPLETED | OUTPATIENT
Start: 2024-05-24 | End: 2024-05-24

## 2024-05-24 RX ORDER — LIDOCAINE HYDROCHLORIDE 20 MG/ML
INJECTION, SOLUTION INFILTRATION; PERINEURAL AS NEEDED
Status: COMPLETED | OUTPATIENT
Start: 2024-05-24 | End: 2024-05-24

## 2024-05-24 RX ORDER — HYDROCODONE BITARTRATE AND ACETAMINOPHEN 7.5; 325 MG/1; MG/1
1 TABLET ORAL EVERY 6 HOURS PRN
COMMUNITY

## 2024-05-24 RX ORDER — LIDOCAINE HYDROCHLORIDE 5 MG/ML
INJECTION, SOLUTION INFILTRATION; PERINEURAL AS NEEDED
Status: COMPLETED | OUTPATIENT
Start: 2024-05-24 | End: 2024-05-24

## 2024-05-24 RX ADMIN — IOHEXOL 1 ML: 240 INJECTION, SOLUTION INTRATHECAL; INTRAVASCULAR; INTRAVENOUS; ORAL at 14:08

## 2024-05-24 RX ADMIN — LIDOCAINE HYDROCHLORIDE 5 ML: 20 INJECTION, SOLUTION INFILTRATION; PERINEURAL at 14:08

## 2024-05-24 RX ADMIN — TRIAMCINOLONE ACETONIDE 40 MG: 40 INJECTION, SUSPENSION INTRA-ARTICULAR; INTRAMUSCULAR at 14:08

## 2024-05-24 RX ADMIN — LIDOCAINE HYDROCHLORIDE 9 ML: 5 INJECTION, SOLUTION INFILTRATION; PERINEURAL at 14:08

## 2024-05-24 ASSESSMENT — PAIN SCALES - GENERAL
PAINLEVEL_OUTOF10: 3

## 2024-05-24 ASSESSMENT — PAIN - FUNCTIONAL ASSESSMENT
PAIN_FUNCTIONAL_ASSESSMENT: 0-10
PAIN_FUNCTIONAL_ASSESSMENT: 0-10

## 2024-05-24 ASSESSMENT — COLUMBIA-SUICIDE SEVERITY RATING SCALE - C-SSRS
6. HAVE YOU EVER DONE ANYTHING, STARTED TO DO ANYTHING, OR PREPARED TO DO ANYTHING TO END YOUR LIFE?: NO
1. IN THE PAST MONTH, HAVE YOU WISHED YOU WERE DEAD OR WISHED YOU COULD GO TO SLEEP AND NOT WAKE UP?: NO
2. HAVE YOU ACTUALLY HAD ANY THOUGHTS OF KILLING YOURSELF?: NO

## 2024-05-24 ASSESSMENT — PAIN DESCRIPTION - DESCRIPTORS: DESCRIPTORS: ACHING;CRAMPING

## 2024-05-24 NOTE — OP NOTE
Preprocedure diagnosis: Lumbar radiculopathy  Postprocedure diagnosis lumbar radiculopathy    Procedure performed: Caudal epidural steroid injection    Physician: Leander Pompa MD    Anesthesia: Local    Complications: none    Blood loss:  none    Clinical note: This is a very pleasant 48-year-old female who suffers with low back and leg pain here meeting all medical criteria for above-mentioned procedure.    Procedure:  Procedure: Caudal Epidural Steroid Injection    The patient was identified in the preoperative area.  The procedure was discussed in detail including its risks, benefits, and alternatives.  Signed consent was obtained and the patient agreed to proceed.     The patient was brought to the [facility] procedure room and placed in the prone position onto the fluoroscopy table. The lumbosacral area was prepped and draped in the usual sterile fashion using Chloroprep and a fenestrated drape.  Under fluoroscopic guidance in the lateral view the sacrum was imaged and the the intended needle insertion site was marked onto the skin.  The skin was anesthetized with 5ml of 2% lidocaine.  After adequate skin anesthesia was obtained, a 22 gauge spinale needle was inserted through the sacro-coccygeal ligament into the epidural space under fluoroscopic guidance in the AP view.  The needle was aspirated and 1 ml of Omnipaque contrast dye was injected under live fluoroscopy which showed epidural spread.  The needle was then advanced under intermittent fluoroscopic guidance in the AP until the needle tip was between the S2 and S3 neuro-foramen.  The needle was aspirated for heme and csf again, which was negative, and 1 ml of Omnipaque was injected under live fluoroscopy which showed appropriate spread without intrathecal or intravascular uptake.  Then 9 ml of 0.5% preservative free lidocaine and 40 mg of triamcinolone was injected.  The needle was removed and a sterile bandage was applied. The patient was take back to  the recovery area.     The procedure was completed without complications and was tolerated well. The patient was monitored after the procedure. The patient (or responsible party) was given post-procedure and discharge instructions to follow at home. The patient was discharged in stable condition. A follow up appointment was made.

## 2024-05-24 NOTE — DISCHARGE INSTRUCTIONS
DISCHARGE INSTRUCTIONS FOR INJECTIONS     You underwent Caudal LILY today    Aftermost injections, it is recommended that you relax and limit your activity for the remainder of the day unless you have been told otherwise by your pain physician.  You should not drive a car, operate machinery, or make important legal decisions unless otherwise directed by your pain physician.  You may resume your normal activity, including exercise, tomorrow.      Keep a written pain diary of how much pain relief you experienced following the injection procedure and the length of time of pain relief you experienced pain relief. Following diagnostic injections like medial branch nerve blocks, sacroiliac joint blocks, stellate ganglion injections and other blocks, it is very important you record the specific amount of pain relief you experienced immediately after the injectionand how long it lasted. Your doctor will ask you for this information at your follow up visit.     For all injections, please keep the injection site dry and inspect the site for a couple of days. You may remove the Band-Aid the day of the injection at any time.     Some discomfort, bruising or slight swelling may occur at the injection site. This is not abnormal if it occurs.  If needed you may:    -Take over the counter medication such as Tylenol or Motrin.   -Apply an ice pack for 30 minutes, 2 to 3 times a day for the first 24 hours.     You may shower today; no soaking baths, hot tubs, whirlpools or swimming pools for two days.      If you are given steroids in your injection, it may take 3-5 days for the steroid medication to take effect. You may notice a worsening of your symptoms for 1-2 days after the injection. This is not abnormal.  You may use acetaminophen, ibuprofen, or prescription medication that your doctor may have prescribed for you if you need to do so.     A few common side effects of steroids include facial flushing, sweating, restlessness,  irritability,difficulty sleeping, increase in blood sugar, and increased blood pressure. If you have diabetes, please monitor your blood sugar at least once a day for at least 5 days. If you have poorly controlled high blood pressure, monitoryour blood pressure for at least 2 days and contact your primary care physician if these numbers are unusually high for you.      If you take aspirin or non-steroidal anti-inflammatory drugs (examples are Motrin, Advil, ibuprofen, Naprosyn, Voltaren, Relafen, etc.) you may restart these this evening, but stop taking it 3 days before your next appointment, unless instructed otherwiseby your physician.      You do not need to discontinue non-aspirin-containing pain medications prior to an injection (examples: Celebrex, tramadol, hydrocodone and acetaminophen).      If you take a blood thinning medication (Coumadin, Lovenox, Fragmin,Ticlid, Plavix, Pradaxa, etc.), please discuss this with your primary care physician/cardiologist and your pain physician. These medications MUST be discontinued before you can have an injection safely, without the risk of uncontrolled bleeding. If these medications are not discontinued for an appropriate period of time, you will not be able to receivean injection.      If you are taking Coumadin, please have your INR checked the morning of your procedure and bringthe result to your appointment unless otherwise instructed. If your INR is over 1.2, your injection may need to be rescheduled to avoid uncontrolled bleeding from the needle placement.     Call [facility] at [phone] between 8am-4pm Monday - Friday if you are experiencing the following:    If you received an epidural or spinal injection:    -Headache that doesnot go away with medicine, is worse when sitting or standing up, and is greatly relieved upon lying down.   -Severe pain worse than or different than your baseline pain.   -Chills or fever (101º F or greater).    -Drainage or signs of infection at the injection site     Go directly to the Emergency Department if you are experiencing the following and received an epidural or spinal injection:   -Abrupt weakness or progressive weakness in your legs that starts after you leave the clinic.   -Abrupt severe or worsening numbness in your legs.   -Inability to urinate after the injection or loss of bowel or bladder control without the urge to defecate or urinate.     If you have a clinical question that cannot wait until your next appointment, please call [phone] between 8am-4pm Monday - Friday or send a [employer] message. We do our best to return all non-emergency messages within 24 hours, Monday - Friday. A nurse or physician will return your message.      If you need to cancel an appointment, please call the scheduling staff at [phone] during normal business hours or leave a message at least 24 hours in advance.     If you are going to be sedated for your next procedure, you MUST have responsible adult who can legally drive accompany you home. You cannot eat or drink for eight hours prior to the planned procedure if you are going to receive sedation. You may take your non-blood thinning medications with a small sip of water.

## 2024-05-24 NOTE — PERIOPERATIVE NURSING NOTE
1414- Arrived to preprocedure room 5. Alert, pain unchanged, bandaid to low back/sacrum dry and intact. VSS. Denies any numbness or tingling in legs. Reviewed discharge instructions. Discharged home

## 2024-06-07 ENCOUNTER — OFFICE VISIT (OUTPATIENT)
Dept: ORTHOPEDIC SURGERY | Facility: CLINIC | Age: 49
End: 2024-06-07
Payer: COMMERCIAL

## 2024-06-07 VITALS — WEIGHT: 160 LBS | HEIGHT: 64 IN | BODY MASS INDEX: 27.31 KG/M2

## 2024-06-07 DIAGNOSIS — M54.16 LUMBAR RADICULOPATHY: ICD-10-CM

## 2024-06-07 DIAGNOSIS — M51.36 DDD (DEGENERATIVE DISC DISEASE), LUMBAR: Primary | ICD-10-CM

## 2024-06-07 PROCEDURE — 99214 OFFICE O/P EST MOD 30 MIN: CPT | Performed by: ORTHOPAEDIC SURGERY

## 2024-06-07 PROCEDURE — 3008F BODY MASS INDEX DOCD: CPT | Performed by: ORTHOPAEDIC SURGERY

## 2024-06-07 ASSESSMENT — PAIN - FUNCTIONAL ASSESSMENT: PAIN_FUNCTIONAL_ASSESSMENT: 0-10

## 2024-06-07 NOTE — LETTER
June 11, 2024     Dang Calvert DO  [address]  [facility]  [facility] [address]    Patient: Fani Garcia   YOB: 1975   Date of Visit: 6/7/2024       Dear Dr. Dang Calvert DO:    Thank you for referring Fani Garcia to me for evaluation. Below are my notes for this consultation.  If you have questions, please do not hesitate to call me. I look forward to following your patient along with you.       Sincerely,     Robert Freeman MD      CC: No Recipients  ______________________________________________________________________________________    HPI:Fani Garcia is a 48-year-old woman who comes in with complaints of low back pain.  She also has some numbness and tingling that goes into the left leg.  She has had some steroid injections.  She denies any constitutional symptoms.      ROS:  Reviewed on EMR and patient intake sheet.    PMH/SH:  Reviewed on EMR and patient intake sheet.    Exam:  Physical Exam    Constitutional: Well appearing; no acute distress  Eyes: pupils are equal and round  Psych: normal affect  Respiratory: non-labored breathing  Cardiovascular: regular rate and rhythm  GI: non-distended abdomen  Musculoskeletal: no pain with range of motion of the hips bilaterally  Neurologic: [4+]/5 strength in the lower extremities bilaterally]; [negative] straight leg raise    Radiology:     MRI personally reviewed.  No evidence of any significant stenosis and/or neural compression moderate disc degeneration L5-S1    Diagnosis:    Degenerative disc disease; lumbar radiculopathy    Assessment and Plan:   48-year-old woman with chronic discogenic back pain and intermittent radicular symptoms.  MRI does not demonstrate any neural compression and/or stenosis which would benefit from surgical intervention.  I recommend continued nonoperative management.  She can follow-up as needed.    The patient was in agreement with the plan. At the end of the visit today,  the patient felt that all questions had been answered satisfactorily.  The patient was pleased with the visit and very appreciative for the care rendered.     Thank you very much for the kind referral.  It is a privilege, and a pleasure, to partner with you in the care of your patients.  I would be delighted to assist you with any further consultations as needed.          Robert Freeman MD    Chief of Spine Surgery, [facility]  Director of Spine Service, [facility]  , Department of Orthopaedics  [facility] [employer]  [address] [id number]  P: [phone]  [facility]    This note was dictated with voice recognition software.  It has not been proofread for grammatical errors, typographical mistakes or other semantic inconsistencies.

## 2024-06-11 NOTE — PROGRESS NOTES
HPI:Fani Garcai is a 48-year-old woman who comes in with complaints of low back pain.  She also has some numbness and tingling that goes into the left leg.  She has had some steroid injections.  She denies any constitutional symptoms.      ROS:  Reviewed on EMR and patient intake sheet.    PMH/SH:  Reviewed on EMR and patient intake sheet.    Exam:  Physical Exam    Constitutional: Well appearing; no acute distress  Eyes: pupils are equal and round  Psych: normal affect  Respiratory: non-labored breathing  Cardiovascular: regular rate and rhythm  GI: non-distended abdomen  Musculoskeletal: no pain with range of motion of the hips bilaterally  Neurologic: [4+]/5 strength in the lower extremities bilaterally]; [negative] straight leg raise    Radiology:     MRI personally reviewed.  No evidence of any significant stenosis and/or neural compression moderate disc degeneration L5-S1    Diagnosis:    Degenerative disc disease; lumbar radiculopathy    Assessment and Plan:   48-year-old woman with chronic discogenic back pain and intermittent radicular symptoms.  MRI does not demonstrate any neural compression and/or stenosis which would benefit from surgical intervention.  I recommend continued nonoperative management.  She can follow-up as needed.    The patient was in agreement with the plan. At the end of the visit today, the patient felt that all questions had been answered satisfactorily.  The patient was pleased with the visit and very appreciative for the care rendered.     Thank you very much for the kind referral.  It is a privilege, and a pleasure, to partner with you in the care of your patients.  I would be delighted to assist you with any further consultations as needed.          Robert Freeman MD    Chief of Spine Surgery, [facility]  Director of Spine Service, [facility]  , Department of Orthopaedics  [facility]  [facility]  [address] [place] [address]  P: [phone]  [facility] Hospital[facility]    This note was dictated with voice recognition software.  It has not been proofread for grammatical errors, typographical mistakes or other semantic inconsistencies.

## 2024-06-19 DIAGNOSIS — N80.9 ENDOMETRIOSIS: Primary | ICD-10-CM

## 2024-06-19 PROCEDURE — RXMED WILLOW AMBULATORY MEDICATION CHARGE

## 2024-06-19 RX ORDER — NORETHINDRONE 5 MG/1
5 TABLET ORAL DAILY
Qty: 90 TABLET | Refills: 0 | Status: SHIPPED | OUTPATIENT
Start: 2024-06-19 | End: 2025-06-19

## 2024-06-20 ENCOUNTER — PHARMACY VISIT (OUTPATIENT)
Dept: PHARMACY | Facility: CLINIC | Age: 49
End: 2024-06-20
Payer: COMMERCIAL

## 2024-06-28 ENCOUNTER — APPOINTMENT (OUTPATIENT)
Dept: SLEEP MEDICINE | Facility: CLINIC | Age: 49
End: 2024-06-28
Payer: COMMERCIAL

## 2024-07-25 ENCOUNTER — APPOINTMENT (OUTPATIENT)
Dept: OBSTETRICS AND GYNECOLOGY | Facility: CLINIC | Age: 49
End: 2024-07-25
Payer: COMMERCIAL

## 2024-08-06 DIAGNOSIS — K21.9 GASTROESOPHAGEAL REFLUX DISEASE, UNSPECIFIED WHETHER ESOPHAGITIS PRESENT: ICD-10-CM

## 2024-08-06 DIAGNOSIS — E88.810 METABOLIC SYNDROME: ICD-10-CM

## 2024-08-06 DIAGNOSIS — E88.819 INSULIN RESISTANCE: ICD-10-CM

## 2024-08-06 DIAGNOSIS — R73.03 PREDIABETES: ICD-10-CM

## 2024-08-06 PROCEDURE — RXMED WILLOW AMBULATORY MEDICATION CHARGE

## 2024-08-06 RX ORDER — OMEPRAZOLE 20 MG/1
CAPSULE, DELAYED RELEASE ORAL
Qty: 90 CAPSULE | Refills: 1 | Status: SHIPPED | OUTPATIENT
Start: 2024-08-06

## 2024-08-06 RX ORDER — OXYCODONE AND ACETAMINOPHEN 5; 325 MG/1; MG/1
1 TABLET ORAL EVERY 12 HOURS PRN
COMMUNITY
End: 2024-08-10 | Stop reason: SDUPTHER

## 2024-08-06 NOTE — TELEPHONE ENCOUNTER
Phone call to the patient to advise that refills of Omeprazole and Ozempic were sent to the pharmacy and that an appointment is needed for refill of Oxycodone. Unable to reach the patient and VM was full.

## 2024-08-06 NOTE — TELEPHONE ENCOUNTER
Phone call from that patient requesting a refill of Omeprazole, Ozempic and Percocet. No follow up appointment scheduled.

## 2024-08-09 ENCOUNTER — PHARMACY VISIT (OUTPATIENT)
Dept: PHARMACY | Facility: CLINIC | Age: 49
End: 2024-08-09
Payer: COMMERCIAL

## 2024-08-10 DIAGNOSIS — M96.1 FAILED BACK SYNDROME: Primary | ICD-10-CM

## 2024-08-10 RX ORDER — OXYCODONE AND ACETAMINOPHEN 5; 325 MG/1; MG/1
1 TABLET ORAL EVERY 12 HOURS PRN
Qty: 60 TABLET | Refills: 0 | Status: SHIPPED | OUTPATIENT
Start: 2024-08-10 | End: 2024-09-09

## 2024-08-10 NOTE — PROGRESS NOTES
Patient requesting refill of Percocet 5-325mg BID. Discussed Dr. Pompa who just saw the patient. There was an error with transcribing the refill therefore I am sending the refill as previously prescribed by my collaborating physician, Dr. Pompa.

## 2024-08-30 ENCOUNTER — APPOINTMENT (OUTPATIENT)
Dept: OBSTETRICS AND GYNECOLOGY | Facility: CLINIC | Age: 49
End: 2024-08-30
Payer: COMMERCIAL

## 2024-09-12 ENCOUNTER — OFFICE VISIT (OUTPATIENT)
Dept: PAIN MEDICINE | Facility: CLINIC | Age: 49
End: 2024-09-12
Payer: COMMERCIAL

## 2024-09-12 VITALS
HEART RATE: 99 BPM | SYSTOLIC BLOOD PRESSURE: 148 MMHG | OXYGEN SATURATION: 98 % | DIASTOLIC BLOOD PRESSURE: 89 MMHG | BODY MASS INDEX: 27.31 KG/M2 | HEIGHT: 64 IN | WEIGHT: 160 LBS

## 2024-09-12 DIAGNOSIS — M96.1 FAILED BACK SYNDROME: ICD-10-CM

## 2024-09-12 DIAGNOSIS — Z79.891 LONG TERM CURRENT USE OF OPIATE ANALGESIC: Primary | ICD-10-CM

## 2024-09-12 PROCEDURE — 3008F BODY MASS INDEX DOCD: CPT | Performed by: NURSE PRACTITIONER

## 2024-09-12 PROCEDURE — 80307 DRUG TEST PRSMV CHEM ANLYZR: CPT | Mod: WESLAB | Performed by: NURSE PRACTITIONER

## 2024-09-12 PROCEDURE — 99214 OFFICE O/P EST MOD 30 MIN: CPT | Performed by: NURSE PRACTITIONER

## 2024-09-12 PROCEDURE — 80359 METHYLENEDIOXYAMPHETAMINES: CPT | Mod: WESLAB | Performed by: NURSE PRACTITIONER

## 2024-09-12 RX ORDER — OXYCODONE AND ACETAMINOPHEN 5; 325 MG/1; MG/1
1 TABLET ORAL EVERY 12 HOURS PRN
Qty: 60 TABLET | Refills: 0 | Status: SHIPPED | OUTPATIENT
Start: 2024-09-12 | End: 2024-10-12

## 2024-09-12 ASSESSMENT — PAIN DESCRIPTION - DESCRIPTORS: DESCRIPTORS: ACHING

## 2024-09-12 ASSESSMENT — PAIN SCALES - GENERAL
PAINLEVEL: 8
PAINLEVEL_OUTOF10: 8

## 2024-09-12 ASSESSMENT — PAIN - FUNCTIONAL ASSESSMENT: PAIN_FUNCTIONAL_ASSESSMENT: 0-10

## 2024-09-12 NOTE — PROGRESS NOTES
Subjective   Patient ID: Fani Garcia is a 49 y.o. female who presents for Med Refill and Back Pain.    HPI 48-year-old female with a history of obesity and associated comorbidities of Hypertension, Hyperlipidemia, Joint Pain, Obstructive sleep apnea and Vitamin-D deficiency. Of note, Fani underwent an L4-L5 MAS TLIF with Dr. Federico Cruz on February 21, 2023. Fani had been doing relatively well in her recovery but, unfortunately, she has had a recurrence of the back and radicular leg pain she was having before surgery. In addition, she started having significant right hip pain for which she received an intra-articular joint injection by Dr. Pompa on 11/14/2023. Fani reports her hip pain has significantly improved since her injection. In addition, she underwent a Caudal injection with Dr. Pompa on 12/8/2023. She is still using the Gabapentin for the back and radicular leg pain. She is unable to take it TID during the work week as it further increases her daytime somnolence. She also utilizes Percocet infrequently, however she has been relying on it more recently as her pain has continued to worsen.  She can't stand for more than 10 min at a time.     Review of Systems Unless noted in the HPI all other systems have been reviewed and are negative for complaint.     Objective   Physical Exam  General- No acute distress, well appearing and well nourished.   Eyes Conjunctiva and lids: No erythema, swelling or discharge  Neck - Supple, no cervical lymphadenopathy.   Pulmonary - Respiratory effort: Normal respiration.   Cardiovascular - Normal rate and rhythm.  Examination of extremities for edema and/or varicosities: No peripheral edema  Abdomen: Soft, Non-tender, non-distended, no abdominal masses.   Musculoskeletal - Range of motion: decreased ROM to the lumbar spine with accompanied weakness to the BLE.  Skin - Skin and subcutaneous tissue: Normal without rashes or lesions.  Neurologic - Reflexes: Normal.  Coordination: antalgic gait.  Psychiatric - Orientation to person, place, and time: Normal. Mood and affect: Normal.    Assessment/Plan       TREATMENT PLAN:  I had a nice discussion with the patient today and our plan will be as follows:  Radiology: No new imaging to review at this time.   Physically: Encouraged patient to continue with increased physical activity as able.   Psychologically: No need for psychologic intervention from my standpoint. There are no mental health issues of which I am aware that are contributing to the patient's pain. There are no substance abuse or alcohol abuse issues of which I am aware that are contributing to the patient's pain.   Medication: I will refill the patient's opioids today for [ 1 ] month.  The patient continues to see benefit and improvement in their quality of life and ability to maintain ADLs. Patient educated about the risks of taking opioids and operating a motor vehicle.  Patient reports no adverse side effects to current medication regimen.  Current regimen does allow patient to maintain ADLs.  Patient reports no new neurologic symptoms, new pain areas, or exacerbation in pain today.  Patient reports they are happy with current treatment care path. Patient has been educated on the risks, benefits, and alternatives of controlled substances as well as the proper way to store these medications. The patient and I discussed the nature of this medication and its side effects.  We discussed tolerance, physical dependence, psychological dependence, addiction and opioid-induced hyperalgesia.  We discussed the potential need to wean from this medication.  We discussed the availability of programs that can help with this process if necessary.  We discussed safety issues related to opioids including safe storage.  We discussed the fact that the patient should not drive an automobile or operate heavy machinery while taking this medication.  A prescription for naloxone was  offered to the patient.  The patient will be re-evaluated for the need to continue opioid therapy in 60-90 days.  A PDMP report was reviewed today and was consistent with reported prescribing.  A controlled substance agreement was presented to the patient today.  The patient had the opportunity to read through the agreement during the office visit and has signed the agreement today.  A copy of the agreement was given to the patient to take home for their records.  Toxicology screening today.  Duration: Chronic/ongoing.    Intervention: Nothing at this time.

## 2024-09-13 LAB
AMPHETAMINES UR QL SCN: ABNORMAL
BARBITURATES UR QL SCN: ABNORMAL
BZE UR QL SCN: ABNORMAL
CANNABINOIDS UR QL SCN: ABNORMAL
CREAT UR-MCNC: 221.7 MG/DL (ref 20–320)
PCP UR QL SCN: ABNORMAL

## 2024-09-16 LAB
AMPHET UR-MCNC: <50 NG/ML
MDA UR-MCNC: <200 NG/ML
MDEA UR-MCNC: <200 NG/ML
MDMA UR-MCNC: <200 NG/ML
METHAMPHET UR-MCNC: <200 NG/ML
PHENTERMINE UR CFM-MCNC: >5000 NG/ML

## 2024-09-25 DIAGNOSIS — R63.2 POLYPHAGIA: ICD-10-CM

## 2024-09-25 DIAGNOSIS — E66.3 OVERWEIGHT (BMI 25.0-29.9): ICD-10-CM

## 2024-09-25 DIAGNOSIS — R73.03 PREDIABETES: ICD-10-CM

## 2024-09-25 DIAGNOSIS — E88.819 INSULIN RESISTANCE: ICD-10-CM

## 2024-09-25 DIAGNOSIS — E88.810 METABOLIC SYNDROME: ICD-10-CM

## 2024-09-25 RX ORDER — PHENTERMINE AND TOPIRAMATE 15; 92 MG/1; MG/1
15-92 CAPSULE, EXTENDED RELEASE ORAL DAILY
Qty: 30 CAPSULE | Refills: 3 | Status: SHIPPED | OUTPATIENT
Start: 2024-09-25 | End: 2024-10-25

## 2024-09-26 DIAGNOSIS — N80.9 ENDOMETRIOSIS: ICD-10-CM

## 2024-09-26 DIAGNOSIS — M54.50 LOW BACK PAIN, UNSPECIFIED BACK PAIN LATERALITY, UNSPECIFIED CHRONICITY, UNSPECIFIED WHETHER SCIATICA PRESENT: ICD-10-CM

## 2024-09-26 RX ORDER — CELECOXIB 200 MG/1
200 CAPSULE ORAL 2 TIMES DAILY
Qty: 60 CAPSULE | Refills: 0 | Status: SHIPPED | OUTPATIENT
Start: 2024-09-26

## 2024-09-26 RX ORDER — NORETHINDRONE 5 MG/1
5 TABLET ORAL DAILY
Qty: 30 TABLET | Refills: 0 | Status: SHIPPED | OUTPATIENT
Start: 2024-09-26 | End: 2025-09-26

## 2024-09-26 RX ORDER — NORETHINDRONE 5 MG/1
5 TABLET ORAL DAILY
Qty: 90 TABLET | Refills: 0 | Status: SHIPPED | OUTPATIENT
Start: 2024-09-26 | End: 2024-09-26 | Stop reason: WASHOUT

## 2024-10-02 DIAGNOSIS — M54.50 LOW BACK PAIN, UNSPECIFIED BACK PAIN LATERALITY, UNSPECIFIED CHRONICITY, UNSPECIFIED WHETHER SCIATICA PRESENT: ICD-10-CM

## 2024-10-02 DIAGNOSIS — F32.A DEPRESSION, UNSPECIFIED DEPRESSION TYPE: ICD-10-CM

## 2024-10-02 DIAGNOSIS — B00.9 HERPES: ICD-10-CM

## 2024-10-02 DIAGNOSIS — E78.5 HYPERLIPIDEMIA, UNSPECIFIED HYPERLIPIDEMIA TYPE: ICD-10-CM

## 2024-10-02 DIAGNOSIS — M79.7 FIBROMYALGIA: ICD-10-CM

## 2024-10-02 RX ORDER — ROSUVASTATIN CALCIUM 10 MG/1
10 TABLET, COATED ORAL EVERY 24 HOURS
Qty: 90 TABLET | Refills: 1 | Status: SHIPPED | OUTPATIENT
Start: 2024-10-02

## 2024-10-02 RX ORDER — DULOXETIN HYDROCHLORIDE 60 MG/1
60 CAPSULE, DELAYED RELEASE ORAL DAILY
Qty: 90 CAPSULE | Refills: 1 | Status: SHIPPED | OUTPATIENT
Start: 2024-10-02 | End: 2025-03-31

## 2024-10-02 RX ORDER — VALACYCLOVIR HYDROCHLORIDE 500 MG/1
500 TABLET, FILM COATED ORAL DAILY
Qty: 90 TABLET | Refills: 1 | Status: SHIPPED | OUTPATIENT
Start: 2024-10-02

## 2024-10-03 NOTE — PROGRESS NOTES
Subjective   Patient ID: Fani Garcia is a 49 y.o. female who presents for Annual Exam.    PAP 6-30-23 NEG NEG; 8-31-18 NEG HPV NEG  MAMMO 4-26-24  DEXA NEVER  COLON 11-5-21, good for 5 years, brother colon cancer.     Kids are juniors at [employer] in the fall. Lost weight, 100 pounds, plateau now. Lamenectomy helped for 6 months. Having back issues. Has a stationary bike.     On Orilissa 150mg for 2 years. Took a break at one point. On Norethindrone now. Gets intermitent pressure and pain. Mom hysterectomy at 36 for bleeding.  New hot flashes now. 3-4 months.     BP up today, traffic was bad.      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     One HSV past year. Gets Valtrex with PCP. Not intimate with  no drive. He might be having issues too. No contraception. Don't have a great marriage. He said no marriage counselor.     Mom passed, heart arrythmia.      Review of Systems   Constitutional:  Positive for fatigue.   Genitourinary:  Positive for pelvic pain.   Musculoskeletal:  Positive for back pain.   Neurological:  Positive for numbness.   Psychiatric/Behavioral:          Hot flashes       Objective   Constitutional: Alert and in no acute distress. Well developed, well nourished.  Neck: no neck asymmetry. Supple and thyroid not enlarged and there were no palpable thyroid nodules.  Chest: Breasts normal appearance, no nipple discharge and no skin changes and palpation of breasts and axillae: no palpable mass and no axillary lymphadenopathy.  Abdomen: soft nontender; no abdominal mass palpated, no organomegaly and no hernias.  Genitourinary: external genitalia: normal, no inguinal lymphadenopathy, Bartholin's urethral and Warner's glands: normal, urethra: normal and bladder: normal on palpation.  Vagina: normal.  Cervix: normal.  Uterus: normal.   Right adnexa/parametria: normal.  Left adnexa/parametria: normal.  Skin: normal skin color and pigmentation, normal skin turgor and no rash.  Psychiatric: alert  and oriented x 3, affect normal to patient baseline and mood appropriate.     Assessment/Plan   -no pap  -Check FSH, E2  -Refill Norethindrone.   -edil

## 2024-10-04 ENCOUNTER — APPOINTMENT (OUTPATIENT)
Dept: OBSTETRICS AND GYNECOLOGY | Facility: CLINIC | Age: 49
End: 2024-10-04
Payer: COMMERCIAL

## 2024-10-04 VITALS
BODY MASS INDEX: 29.84 KG/M2 | WEIGHT: 168.4 LBS | SYSTOLIC BLOOD PRESSURE: 130 MMHG | DIASTOLIC BLOOD PRESSURE: 98 MMHG | HEIGHT: 63 IN

## 2024-10-04 DIAGNOSIS — Z12.31 ENCOUNTER FOR SCREENING MAMMOGRAM FOR MALIGNANT NEOPLASM OF BREAST: Primary | ICD-10-CM

## 2024-10-04 DIAGNOSIS — N80.9 ENDOMETRIOSIS: ICD-10-CM

## 2024-10-04 DIAGNOSIS — N95.1 MENOPAUSAL SYMPTOM: ICD-10-CM

## 2024-10-04 PROCEDURE — 3008F BODY MASS INDEX DOCD: CPT | Performed by: OBSTETRICS & GYNECOLOGY

## 2024-10-04 PROCEDURE — 1036F TOBACCO NON-USER: CPT | Performed by: OBSTETRICS & GYNECOLOGY

## 2024-10-04 PROCEDURE — 99396 PREV VISIT EST AGE 40-64: CPT | Performed by: OBSTETRICS & GYNECOLOGY

## 2024-10-04 RX ORDER — NORETHINDRONE 5 MG/1
5 TABLET ORAL DAILY
Qty: 90 TABLET | Refills: 3 | Status: SHIPPED | OUTPATIENT
Start: 2024-10-04 | End: 2025-10-04

## 2024-10-04 ASSESSMENT — ENCOUNTER SYMPTOMS
NUMBNESS: 1
FATIGUE: 1
BACK PAIN: 1

## 2024-10-11 ENCOUNTER — LAB (OUTPATIENT)
Dept: LAB | Facility: LAB | Age: 49
End: 2024-10-11
Payer: COMMERCIAL

## 2024-10-11 ENCOUNTER — APPOINTMENT (OUTPATIENT)
Dept: PRIMARY CARE | Facility: CLINIC | Age: 49
End: 2024-10-11
Payer: COMMERCIAL

## 2024-10-11 VITALS
SYSTOLIC BLOOD PRESSURE: 132 MMHG | BODY MASS INDEX: 29.4 KG/M2 | WEIGHT: 168 LBS | DIASTOLIC BLOOD PRESSURE: 90 MMHG | HEART RATE: 82 BPM | OXYGEN SATURATION: 97 %

## 2024-10-11 DIAGNOSIS — M54.50 LOW BACK PAIN, UNSPECIFIED BACK PAIN LATERALITY, UNSPECIFIED CHRONICITY, UNSPECIFIED WHETHER SCIATICA PRESENT: ICD-10-CM

## 2024-10-11 DIAGNOSIS — M79.7 FIBROMYALGIA: ICD-10-CM

## 2024-10-11 DIAGNOSIS — E88.810 METABOLIC SYNDROME: ICD-10-CM

## 2024-10-11 DIAGNOSIS — E78.5 HYPERLIPIDEMIA, UNSPECIFIED HYPERLIPIDEMIA TYPE: ICD-10-CM

## 2024-10-11 DIAGNOSIS — Z00.00 HEALTHCARE MAINTENANCE: ICD-10-CM

## 2024-10-11 DIAGNOSIS — B00.9 HERPES: ICD-10-CM

## 2024-10-11 DIAGNOSIS — E88.819 INSULIN RESISTANCE: ICD-10-CM

## 2024-10-11 DIAGNOSIS — M54.16 LUMBAR RADICULOPATHY: ICD-10-CM

## 2024-10-11 DIAGNOSIS — M25.512 ACUTE PAIN OF LEFT SHOULDER: ICD-10-CM

## 2024-10-11 DIAGNOSIS — R73.03 PREDIABETES: ICD-10-CM

## 2024-10-11 DIAGNOSIS — B35.1 ONYCHOMYCOSIS: Primary | ICD-10-CM

## 2024-10-11 DIAGNOSIS — Z98.1 S/P LUMBAR FUSION: ICD-10-CM

## 2024-10-11 DIAGNOSIS — M48.07 STENOSIS OF LUMBOSACRAL SPINE: ICD-10-CM

## 2024-10-11 DIAGNOSIS — F32.A DEPRESSION, UNSPECIFIED DEPRESSION TYPE: ICD-10-CM

## 2024-10-11 DIAGNOSIS — Z23 FLU VACCINE NEED: ICD-10-CM

## 2024-10-11 DIAGNOSIS — N95.1 MENOPAUSAL SYMPTOM: ICD-10-CM

## 2024-10-11 LAB
ALBUMIN SERPL BCP-MCNC: 4.3 G/DL (ref 3.4–5)
ALP SERPL-CCNC: 62 U/L (ref 33–110)
ALT SERPL W P-5'-P-CCNC: 17 U/L (ref 7–45)
ANION GAP SERPL CALC-SCNC: 11 MMOL/L (ref 10–20)
AST SERPL W P-5'-P-CCNC: 19 U/L (ref 9–39)
BASOPHILS # BLD AUTO: 0.03 X10*3/UL (ref 0–0.1)
BASOPHILS NFR BLD AUTO: 0.5 %
BILIRUB SERPL-MCNC: 0.9 MG/DL (ref 0–1.2)
BUN SERPL-MCNC: 18 MG/DL (ref 6–23)
CALCIUM SERPL-MCNC: 9.3 MG/DL (ref 8.6–10.3)
CHLORIDE SERPL-SCNC: 109 MMOL/L (ref 98–107)
CHOLEST SERPL-MCNC: 128 MG/DL (ref 0–199)
CHOLESTEROL/HDL RATIO: 2.9
CO2 SERPL-SCNC: 22 MMOL/L (ref 21–32)
CREAT SERPL-MCNC: 1.02 MG/DL (ref 0.5–1.05)
EGFRCR SERPLBLD CKD-EPI 2021: 67 ML/MIN/1.73M*2
EOSINOPHIL # BLD AUTO: 0.03 X10*3/UL (ref 0–0.7)
EOSINOPHIL NFR BLD AUTO: 0.5 %
ERYTHROCYTE [DISTWIDTH] IN BLOOD BY AUTOMATED COUNT: 13 % (ref 11.5–14.5)
ESTRADIOL SERPL-MCNC: <19 PG/ML
FSH SERPL-ACNC: <0.9 IU/L
GLUCOSE SERPL-MCNC: 82 MG/DL (ref 74–99)
HCT VFR BLD AUTO: 47.1 % (ref 36–46)
HDLC SERPL-MCNC: 44.1 MG/DL
HGB BLD-MCNC: 15 G/DL (ref 12–16)
HIV 1+2 AB+HIV1 P24 AG SERPL QL IA: NONREACTIVE
IMM GRANULOCYTES # BLD AUTO: 0.02 X10*3/UL (ref 0–0.7)
IMM GRANULOCYTES NFR BLD AUTO: 0.3 % (ref 0–0.9)
LDLC SERPL CALC-MCNC: 74 MG/DL
LYMPHOCYTES # BLD AUTO: 1.54 X10*3/UL (ref 1.2–4.8)
LYMPHOCYTES NFR BLD AUTO: 25.8 %
MCH RBC QN AUTO: 29.4 PG (ref 26–34)
MCHC RBC AUTO-ENTMCNC: 31.8 G/DL (ref 32–36)
MCV RBC AUTO: 92 FL (ref 80–100)
MONOCYTES # BLD AUTO: 0.38 X10*3/UL (ref 0.1–1)
MONOCYTES NFR BLD AUTO: 6.4 %
NEUTROPHILS # BLD AUTO: 3.96 X10*3/UL (ref 1.2–7.7)
NEUTROPHILS NFR BLD AUTO: 66.5 %
NON HDL CHOLESTEROL: 84 MG/DL (ref 0–149)
NRBC BLD-RTO: 0 /100 WBCS (ref 0–0)
PLATELET # BLD AUTO: 334 X10*3/UL (ref 150–450)
POTASSIUM SERPL-SCNC: 4 MMOL/L (ref 3.5–5.3)
PROT SERPL-MCNC: 7 G/DL (ref 6.4–8.2)
RBC # BLD AUTO: 5.11 X10*6/UL (ref 4–5.2)
SODIUM SERPL-SCNC: 138 MMOL/L (ref 136–145)
TRIGL SERPL-MCNC: 49 MG/DL (ref 0–149)
TSH SERPL-ACNC: 1.33 MIU/L (ref 0.44–3.98)
VLDL: 10 MG/DL (ref 0–40)
WBC # BLD AUTO: 6 X10*3/UL (ref 4.4–11.3)

## 2024-10-11 PROCEDURE — 36415 COLL VENOUS BLD VENIPUNCTURE: CPT

## 2024-10-11 PROCEDURE — 87389 HIV-1 AG W/HIV-1&-2 AB AG IA: CPT

## 2024-10-11 PROCEDURE — 80061 LIPID PANEL: CPT

## 2024-10-11 PROCEDURE — 83036 HEMOGLOBIN GLYCOSYLATED A1C: CPT

## 2024-10-11 PROCEDURE — 84443 ASSAY THYROID STIM HORMONE: CPT

## 2024-10-11 PROCEDURE — 80053 COMPREHEN METABOLIC PANEL: CPT

## 2024-10-11 PROCEDURE — 90656 IIV3 VACC NO PRSV 0.5 ML IM: CPT | Performed by: STUDENT IN AN ORGANIZED HEALTH CARE EDUCATION/TRAINING PROGRAM

## 2024-10-11 PROCEDURE — 90471 IMMUNIZATION ADMIN: CPT | Performed by: STUDENT IN AN ORGANIZED HEALTH CARE EDUCATION/TRAINING PROGRAM

## 2024-10-11 PROCEDURE — 83001 ASSAY OF GONADOTROPIN (FSH): CPT

## 2024-10-11 PROCEDURE — 85025 COMPLETE CBC W/AUTO DIFF WBC: CPT

## 2024-10-11 PROCEDURE — 82670 ASSAY OF TOTAL ESTRADIOL: CPT

## 2024-10-11 PROCEDURE — 99214 OFFICE O/P EST MOD 30 MIN: CPT | Performed by: STUDENT IN AN ORGANIZED HEALTH CARE EDUCATION/TRAINING PROGRAM

## 2024-10-11 PROCEDURE — 1036F TOBACCO NON-USER: CPT | Performed by: STUDENT IN AN ORGANIZED HEALTH CARE EDUCATION/TRAINING PROGRAM

## 2024-10-11 RX ORDER — VALACYCLOVIR HYDROCHLORIDE 500 MG/1
500 TABLET, FILM COATED ORAL DAILY
Qty: 90 TABLET | Refills: 1 | Status: SHIPPED | OUTPATIENT
Start: 2024-10-11

## 2024-10-11 RX ORDER — TERBINAFINE HYDROCHLORIDE 250 MG/1
250 TABLET ORAL DAILY
Qty: 90 TABLET | Refills: 0 | Status: SHIPPED | OUTPATIENT
Start: 2024-10-11

## 2024-10-11 RX ORDER — ROSUVASTATIN CALCIUM 10 MG/1
10 TABLET, COATED ORAL EVERY 24 HOURS
Qty: 90 TABLET | Refills: 1 | Status: SHIPPED | OUTPATIENT
Start: 2024-10-11

## 2024-10-11 RX ORDER — CELECOXIB 200 MG/1
200 CAPSULE ORAL 2 TIMES DAILY
Qty: 60 CAPSULE | Refills: 5 | Status: SHIPPED | OUTPATIENT
Start: 2024-10-11

## 2024-10-11 NOTE — PROGRESS NOTES
Subjective   Patient ID: Fani Garcia is a 49 y.o. female who presents for medication refill.  Today she is accompanied by alone.     HPI  Fungal toe infection  Over one year history of bilateral great toe nail and right second toe nail fungal infection. She has previously had this years ago and received relief with an oral medication, of which she cannot recall the name  Feels pressure and occasional pain, denies itching.    Left shoulder pain  Patient presents with 6 month history of left anterior lateral shoulder pain which she noticed after she reached in the backseat of her car as a passenger and felt pain in the anterior aspect of the axilla  Worse with movements and improved with oxycodone which she takes every weekend for her chronic lumbar pain. Tylenol does not provide relief  Limited range of motion with increased pain with reaching up.   Denies numbness, tingling, neck pain.    3. Hyperlipidemia  Currently managed with medication regiment of Rosuvastatin 10 mg oral tabs taken at night  Denies of any recent myalgias or cardiopulmonary symptoms  Lipid panel from 12/28/23 was normal.  Requesting refills and willing to do blood work in the near future    4.  Herpes  Manages breakthrough symptoms with medication regiment of Valacyclovir 500 mg oral tabs  Requesting medication refill  Not currently experiencing a herpetic oral flare  Denies of any recent  symptoms    5.  Depression/fibromyalgia  Stable per the patient  Continues to take 60 mg of duloxetine/Cymbalta  Refills were sent earlier this month  Denies any HI/SI    Current Outpatient Medications on File Prior to Visit   Medication Sig Dispense Refill    aspirin 81 mg EC tablet Take 1 tablet (81 mg) by mouth once daily.      cholecalciferol (Vitamin D-3) 50 MCG (2000 UT) tablet Take 1 tablet (50 mcg) by mouth once daily.      DULoxetine (Cymbalta) 60 mg DR capsule TAKE 1 CAPSULE (60 MG) BY MOUTH ONCE DAILY. DO NOT CRUSH OR CHEW. 90 capsule 1     fluoride, sodium, (PreviDent 5000 Booster Plus) 1.1 % dental paste Use As Directed      gabapentin (Neurontin) 100 mg capsule Take 1 capsule (100 mg) by mouth 3 times a day. 270 capsule 2    norethindrone (Aygestin) 5 mg tablet Take 1 tablet (5 mg) by mouth once daily. 90 tablet 3    omeprazole (PriLOSEC) 20 mg DR capsule TAKE 1 CAPSULE BY MOUTH EVERY DAY 30 MINUTES BEFORE MORNING MEAL 90 capsule 1    oxyCODONE-acetaminophen (Percocet) 5-325 mg tablet Take 1 tablet by mouth every 12 hours if needed for severe pain (7 - 10). 60 tablet 0    phentermine-topiramate (Qsymia) 15-92 mg capsule, ER multiphase 24 hr Take 15-92 mg by mouth once daily. 30 capsule 3    semaglutide 2 mg/dose (8 mg/3 mL) pen injector INJECT 2MG UNDER THE SKIN ONCE WEEKLY 9 mL 2    [DISCONTINUED] celecoxib (CeleBREX) 200 mg capsule TAKE 1 CAPSULE BY MOUTH TWICE A DAY 60 capsule 0    [DISCONTINUED] rosuvastatin (Crestor) 10 mg tablet TAKE 1 TABLET (10 MG) BY MOUTH ONCE EVERY 24 HOURS. 90 tablet 1    [DISCONTINUED] valACYclovir (Valtrex) 500 mg tablet TAKE 1 TABLET BY MOUTH EVERY DAY 90 tablet 1     No current facility-administered medications on file prior to visit.        Allergies   Allergen Reactions    Lisinopril Nausea And Vomiting and Unknown    Meperidine Hives, Rash and Unknown       Immunization History   Administered Date(s) Administered    Flu vaccine (IIV4), preservative free *Check age/dose* 10/28/2016, 11/10/2023    Flu vaccine, trivalent, preservative free, age 6 months and greater (Fluarix/Fluzone/Flulaval) 10/11/2024    Influenza, Unspecified 10/28/2016    Moderna SARS-CoV-2 Vaccination 12/31/2020, 02/19/2021, 12/17/2021    Tdap vaccine, age 7 year and older (BOOSTRIX, ADACEL) 07/24/2020         Review of Systems  All pertinent positive symptoms are included in the history of present illness.  All other systems have been reviewed and are negative and noncontributory to this patient's current ailments.     Objective   /90 (BP  Location: Left arm, Patient Position: Sitting, BP Cuff Size: Adult)   Pulse 82   Wt 76.2 kg (168 lb)   LMP  (LMP Unknown) Comment: will sign waiver  SpO2 97%   BMI 29.40 kg/m²   BSA: 1.85 meters squared  Office Visit on 09/12/2024   Component Date Value Ref Range Status    Creatinine, Urine Random 09/12/2024 221.7  20.0 - 320.0 mg/dL Final    A urine creatinine result >= 20 mg/dL is considered valid without suspicion of dilution.  Samples with results below this range will automatically reflex to specific  gravity testing to verify specimen integrity.    Amphetamine Screen, Urine 09/12/2024 Presumptive Positive (A)  Presumptive Negative Final    CUTOFF LEVEL: 500 NG/ML   Cross-reactivity has been reported with high concentrations   of the following drugs: buproprion, chloroquine, chlorpromazine,   ephedrine, mephentermine, fenfluramine, phentermine,   phenylpropanolamine, pseudoephedrine, and propranolol.    Barbiturate Screen, Urine 09/12/2024 Presumptive Negative  Presumptive Negative Final    CUTOFF LEVEL: 200 NG/ML    Cannabinoid Screen, Urine 09/12/2024 Presumptive Negative  Presumptive Negative Final    CUTOFF LEVEL: 50 NG/ML    Cocaine Metabolite Screen, Urine 09/12/2024 Presumptive Negative  Presumptive Negative Final    CUTOFF LEVEL: 150 NG/ML    PCP Screen, Urine 09/12/2024 Presumptive Negative  Presumptive Negative Final    CUTOFF LEVEL:  25 NG/ML  Cross-reactivity has been reported with dextromethorphan.    Clonazepam 09/12/2024 <25  <25 ng/mL Final    7-Aminoclonazepam 09/12/2024 <25  <25 ng/mL Final    Alprazolam 09/12/2024 <25  <25 ng/mL Final    Alpha-Hydroxyalprazolam 09/12/2024 <25  <25 ng/mL Final    Midazolam 09/12/2024 <25  <25 ng/mL Final    Alpha-Hydroxymidazolam 09/12/2024 <25  <25 ng/mL Final    Chlordiazepoxide 09/12/2024 <25  <25 ng/mL Final    Diazepam 09/12/2024 <25  <25 ng/mL Final    Nordiazepam 09/12/2024 <25  <25 ng/mL Final    Temazepam 09/12/2024 <25  <25 ng/mL Final     Oxazepam 09/12/2024 <25  <25 ng/mL Final    Lorazepam 09/12/2024 <25  <25 ng/mL Final    Methadone 09/12/2024 <25  <25 ng/mL Final    EDDP 09/12/2024 <25  <25 ng/mL Final    6-Acetylmorphine 09/12/2024 <25  <25 ng/mL Final    Codeine 09/12/2024 <50  <50 ng/mL Final    Hydrocodone 09/12/2024 <25  <25 ng/mL Final    Hydromorphone 09/12/2024 <25  <25 ng/mL Final    Morphine  09/12/2024 <50  <50 ng/mL Final    Norhydrocodone 09/12/2024 <25  <25 ng/mL Final    Noroxycodone 09/12/2024 <25  <25 ng/mL Final    Oxycodone 09/12/2024 <25  <25 ng/mL Final    Oxymorphone 09/12/2024 <25  <25 ng/mL Final    Fentanyl 09/12/2024 <2.5  <2.5 ng/mL Final    Norfentanyl 09/12/2024 <2.5  <2.5 ng/mL Final    Tramadol 09/12/2024 <50  <50 ng/mL Final    O-Desmethyltramadol 09/12/2024 <50  <50 ng/mL Final    Zolpidem 09/12/2024 <25  <25 ng/mL Final    Zolpidem Metabolite (ZCA) 09/12/2024 <25  <25 ng/mL Final    Methamphetamine Quant, Ur 09/12/2024 <200  ng/mL Final    MDA, Urine 09/12/2024 <200  ng/mL Final    MDEA, Urine 09/12/2024 <200  ng/mL Final    Phentermine,Urine 09/12/2024 >5000  ng/mL Final    Performed By: [facility]  [address] [place] [address]  : Shady Hernandez MD, PhD  CLIA Number: [id number]    Amphetamines,Urine 09/12/2024 <50  ng/mL Final    INTERPRETIVE INFORMATION: Amphetamines, Urine,                             Quantitative    Methodology: Quantitative Liquid Chromatography-Tandem Mass   Spectrometry    Positive cutoff: 200 ng/mL unless specified below:  Amphetamine     50 ng/mL    For medical purposes only; not valid for forensic use.     The absence of expected drug(s) and/or drug metabolite(s) may   indicate non-compliance, inappropriate timing of specimen   collection relative to drug administration, poor drug absorption,   diluted/adulterated urine, or limitations of testing. The   concentration value must be greater than or equal to the cutoff to   be reported as  positive. Interpretive questions should be directed   to the laboratory.    This test was developed and its performance characteristics   determined by [facility]. It has not been cleared or   approved by the US Food and Drug Administration. This test was   performed in a CLIA certified laboratory and is intended for   clinical purposes.    MDMA, Urine 09/12/2024 <200  ng/mL Final       Physical Exam  CONSTITUTIONAL - well nourished, well developed, looks like stated age, in no acute distress, not ill-appearing, and not tired appearing  SKIN - normal skin color and pigmentation, normal skin turgor without rash, lesions, or nodules visualized. B/L great toe nail and right second toe nail discolored.  HEAD - no trauma, normocephalic  EYES - normal external exam  CHEST -no distressed breathing, good effort  EXTREMITIES - no edema, no deformities. AROM limited in right flexion and right abduction, otherwise extenison, internal rotation, external rotation, abduction within normal limits B/L. PROM right upper extremity limited due to pain in flexion and abduction, otherwise, right extension, internal rotation, external rotation and adduction within normal limits.+neer, +calles, -empty can, -lift off  NEUROLOGICAL - normal balance, normal motor, no ataxia  PSYCHIATRIC - alert, pleasant and cordial, age-appropriate    Assessment/Plan     1.  Fungal toe infection  Take terbinafine for 90 days orally.   We will monitor your liver function throughout the medication duration.  Lab work ordered to be done at your earliest major convenience  We will follow-up at the end of the year    2.  Left shoulder pain  Shoulder xray ordered and we provided a requisition to follow-up with physical therapy  Please perform this at your earliest mutual convenience  Pending your x-ray, if you are interested in a shoulder injection, please make an appointment in the office and we can discuss this further in greater detail    3.  Hyperlipidemia  Lab work ordered and we will notify you of the results and make recommendations accordingly  Otherwise, continue rosuvastatin without any changes stable without any changes recommended  Refills of    4.  Herpes  Stable without any changes at this time  Valacyclovir was sent to your pharmacy    5.  Depression/fibromyalgia  Stable without any changes  Duloxetine sent earlier this month    6.  Flu vaccine need  All questions were answered and you were counseled on immunization(s) in detail and vaccination was provided         Please follow-up for your yearly physical examination in 3 months for continued care or on an as-needed basis

## 2024-10-12 LAB
EST. AVERAGE GLUCOSE BLD GHB EST-MCNC: 97 MG/DL
HBA1C MFR BLD: 5 %

## 2024-10-13 NOTE — RESULT ENCOUNTER NOTE
Sugar, kidneys, liver, electrolytes are all within normal limits of  Chloride being slightly elevated but this has been stable for quite some time    Complete blood cell count note stability    Hemoglobin A1c the best on file at 5.0%    HIV negative    Cholesterol looks great at 128, HDL 44, LDL 74, triglycerides 49    Thyroid within normal limits

## 2024-10-14 ENCOUNTER — HOSPITAL ENCOUNTER (OUTPATIENT)
Dept: RADIOLOGY | Facility: HOSPITAL | Age: 49
Discharge: HOME | End: 2024-10-14
Payer: COMMERCIAL

## 2024-10-14 DIAGNOSIS — M25.512 ACUTE PAIN OF LEFT SHOULDER: ICD-10-CM

## 2024-10-14 PROCEDURE — 73030 X-RAY EXAM OF SHOULDER: CPT | Mod: LT

## 2024-10-14 PROCEDURE — 73030 X-RAY EXAM OF SHOULDER: CPT | Mod: LEFT SIDE | Performed by: RADIOLOGY

## 2024-10-15 NOTE — RESULT ENCOUNTER NOTE
X-ray of the left shoulder shows no acute fractures or dislocations    We recommend following up with physical therapy    If you are interested in still receiving a shoulder injection, please contact the office to get this done at your earliest mutual convenience

## 2024-10-25 ENCOUNTER — APPOINTMENT (OUTPATIENT)
Dept: PRIMARY CARE | Facility: CLINIC | Age: 49
End: 2024-10-25
Payer: COMMERCIAL

## 2024-10-25 VITALS — DIASTOLIC BLOOD PRESSURE: 80 MMHG | OXYGEN SATURATION: 98 % | HEART RATE: 108 BPM | SYSTOLIC BLOOD PRESSURE: 138 MMHG

## 2024-10-25 DIAGNOSIS — M25.512 ACUTE PAIN OF LEFT SHOULDER: Primary | ICD-10-CM

## 2024-10-25 PROCEDURE — 1036F TOBACCO NON-USER: CPT | Performed by: STUDENT IN AN ORGANIZED HEALTH CARE EDUCATION/TRAINING PROGRAM

## 2024-10-25 PROCEDURE — 20610 DRAIN/INJ JOINT/BURSA W/O US: CPT | Performed by: STUDENT IN AN ORGANIZED HEALTH CARE EDUCATION/TRAINING PROGRAM

## 2024-10-25 PROCEDURE — 99212 OFFICE O/P EST SF 10 MIN: CPT | Performed by: STUDENT IN AN ORGANIZED HEALTH CARE EDUCATION/TRAINING PROGRAM

## 2024-10-25 RX ORDER — TRIAMCINOLONE ACETONIDE 40 MG/ML
40 INJECTION, SUSPENSION INTRA-ARTICULAR; INTRAMUSCULAR ONCE
Status: COMPLETED | OUTPATIENT
Start: 2024-10-25 | End: 2024-10-25

## 2024-10-25 ASSESSMENT — PATIENT HEALTH QUESTIONNAIRE - PHQ9
1. LITTLE INTEREST OR PLEASURE IN DOING THINGS: NOT AT ALL
2. FEELING DOWN, DEPRESSED OR HOPELESS: NOT AT ALL
SUM OF ALL RESPONSES TO PHQ9 QUESTIONS 1 AND 2: 0

## 2024-10-25 NOTE — PROGRESS NOTES
"Subjective   Patient ID: Fani Garcia is a 49 y.o. female who presents for Shoulder Injection.    HPI   Patient has a history of 6-month history of anterior shoulder pain which she noted her symptoms worsening when she reached to the backseat of a car    Ultimately she was sent for an x-ray for her shoulder which showed no acute findings    She is coming into the office today to get a cortisone injection as she did very well with a cortisone injection within her right shoulder in the past    Review of Systems  All pertinent positive symptoms are included in the history of present illness.    All other systems have been reviewed and are negative and noncontributory to this patient's current ailments.    Objective   /80 (BP Location: Left arm, Patient Position: Sitting, BP Cuff Size: Adult)   Pulse 108   LMP  (LMP Unknown) Comment: will sign waiver  SpO2 98%     Physical Exam  CONSTITUTIONAL - well nourished, well developed, looks like stated age, in no acute distress, not ill-appearing, and not tired appearing  SKIN - normal skin color and pigmentation, normal skin turgor without rash, lesions, or nodules visualized  HEAD - no trauma, normocephalic  EYES - normal external exam  CHEST -no distressed breathing, good effort  EXTREMITIES - no edema, no deformities  NEUROLOGICAL - normal balance, normal motor, no ataxia  PSYCHIATRIC - alert, pleasant and cordial, age-appropriate    Procedure  Indications, potential risks, complications and side effects, alternatives, and potential outcomes for the injection procedure was discussed with the patient: joint was prepped in a sterile fashion, 22 gauge with 1.5\" length needle was used for injection.    1% lidocaine (2 mL) and Kenalog 40 mg injected into the joint as noted on the examination. It was successful, without complication, and tolerated extremely well.      Assessment/Plan   1.  Shoulder pain    An injection was provided to you today and you tolerated the " procedure very well    Patient was advised to return to the clinic if pain, swelling, or signs of infection occur. It may be necessary to further evaluate along with the possibility of an orthopedic consultation to provide further medical management.    I would also recommend physical therapy at this time if this continues to be worsening and we can even discuss an orthopedic referral if this continues to be an issue

## 2024-11-01 ENCOUNTER — APPOINTMENT (OUTPATIENT)
Dept: SLEEP MEDICINE | Facility: CLINIC | Age: 49
End: 2024-11-01
Payer: COMMERCIAL

## 2024-11-01 ENCOUNTER — OFFICE VISIT (OUTPATIENT)
Dept: SLEEP MEDICINE | Facility: CLINIC | Age: 49
End: 2024-11-01
Payer: COMMERCIAL

## 2024-11-01 VITALS
HEART RATE: 92 BPM | DIASTOLIC BLOOD PRESSURE: 93 MMHG | BODY MASS INDEX: 28.89 KG/M2 | HEIGHT: 64 IN | WEIGHT: 169.2 LBS | SYSTOLIC BLOOD PRESSURE: 154 MMHG

## 2024-11-01 DIAGNOSIS — G47.33 OBSTRUCTIVE SLEEP APNEA SYNDROME IN ADULT: ICD-10-CM

## 2024-11-01 DIAGNOSIS — G47.10 HYPERSOMNOLENCE: ICD-10-CM

## 2024-11-01 DIAGNOSIS — G47.33 OSA (OBSTRUCTIVE SLEEP APNEA): Primary | ICD-10-CM

## 2024-11-01 DIAGNOSIS — F51.12 INSUFFICIENT SLEEP SYNDROME: ICD-10-CM

## 2024-11-01 PROCEDURE — 3008F BODY MASS INDEX DOCD: CPT | Performed by: STUDENT IN AN ORGANIZED HEALTH CARE EDUCATION/TRAINING PROGRAM

## 2024-11-01 PROCEDURE — 1036F TOBACCO NON-USER: CPT | Performed by: STUDENT IN AN ORGANIZED HEALTH CARE EDUCATION/TRAINING PROGRAM

## 2024-11-01 PROCEDURE — 99214 OFFICE O/P EST MOD 30 MIN: CPT | Performed by: STUDENT IN AN ORGANIZED HEALTH CARE EDUCATION/TRAINING PROGRAM

## 2024-11-01 ASSESSMENT — SLEEP AND FATIGUE QUESTIONNAIRES
SLEEP_PROBLEM_NOTICEABLE_TO_OTHERS: VERY MUCH NOTICEABLE
HOW LIKELY ARE YOU TO NOD OFF OR FALL ASLEEP WHILE SITTING QUIETLY AFTER LUNCH WITHOUT ALCOHOL: HIGH CHANCE OF DOZING
WORRIED_DISTRESSED_DUE_TO_SLEEP: VERY MUCH NOTICEABLE
HOW LIKELY ARE YOU TO NOD OFF OR FALL ASLEEP WHILE SITTING AND READING: HIGH CHANCE OF DOZING
SLEEP_PROBLEM_INTERFERES_DAILY_ACTIVITIES: VERY MUCH NOTICEABLE
HOW LIKELY ARE YOU TO NOD OFF OR FALL ASLEEP WHILE LYING DOWN TO REST IN THE AFTERNOON WHEN CIRCUMSTANCES PERMIT: HIGH CHANCE OF DOZING
HOW LIKELY ARE YOU TO NOD OFF OR FALL ASLEEP WHILE SITTING AND TALKING TO SOMEONE: WOULD NEVER DOZE
HOW LIKELY ARE YOU TO NOD OFF OR FALL ASLEEP IN A CAR, WHILE STOPPED FOR A FEW MINUTES IN TRAFFIC: WOULD NEVER DOZE
ESS-CHAD TOTAL SCORE: 18
SATISFACTION_WITH_CURRENT_SLEEP_PATTERN: DISSATISFIED
HOW LIKELY ARE YOU TO NOD OFF OR FALL ASLEEP WHILE WATCHING TV: HIGH CHANCE OF DOZING
HOW LIKELY ARE YOU TO NOD OFF OR FALL ASLEEP WHEN YOU ARE A PASSENGER IN A CAR FOR AN HOUR WITHOUT A BREAK: HIGH CHANCE OF DOZING
SITING INACTIVE IN A PUBLIC PLACE LIKE A CLASS ROOM OR A MOVIE THEATER: HIGH CHANCE OF DOZING

## 2024-11-01 ASSESSMENT — ENCOUNTER SYMPTOMS
NEUROLOGICAL NEGATIVE: 1
RESPIRATORY NEGATIVE: 1
CONSTITUTIONAL NEGATIVE: 1
CARDIOVASCULAR NEGATIVE: 1
PSYCHIATRIC NEGATIVE: 1

## 2024-11-02 PROCEDURE — RXMED WILLOW AMBULATORY MEDICATION CHARGE

## 2024-11-05 ENCOUNTER — PHARMACY VISIT (OUTPATIENT)
Dept: PHARMACY | Facility: CLINIC | Age: 49
End: 2024-11-05
Payer: COMMERCIAL

## 2024-11-22 PROCEDURE — RXMED WILLOW AMBULATORY MEDICATION CHARGE

## 2024-11-25 ENCOUNTER — PHARMACY VISIT (OUTPATIENT)
Dept: PHARMACY | Facility: CLINIC | Age: 49
End: 2024-11-25
Payer: COMMERCIAL

## 2024-12-13 ENCOUNTER — APPOINTMENT (OUTPATIENT)
Dept: PRIMARY CARE | Facility: CLINIC | Age: 49
End: 2024-12-13
Payer: COMMERCIAL

## 2024-12-13 VITALS
SYSTOLIC BLOOD PRESSURE: 122 MMHG | BODY MASS INDEX: 28.84 KG/M2 | DIASTOLIC BLOOD PRESSURE: 80 MMHG | WEIGHT: 168 LBS | OXYGEN SATURATION: 98 % | HEART RATE: 87 BPM

## 2024-12-13 DIAGNOSIS — E88.810 METABOLIC SYNDROME: ICD-10-CM

## 2024-12-13 DIAGNOSIS — Z23 NEED FOR SHINGLES VACCINE: Primary | ICD-10-CM

## 2024-12-13 DIAGNOSIS — R73.03 PREDIABETES: ICD-10-CM

## 2024-12-13 DIAGNOSIS — E88.819 INSULIN RESISTANCE: ICD-10-CM

## 2024-12-13 PROCEDURE — 99213 OFFICE O/P EST LOW 20 MIN: CPT | Performed by: STUDENT IN AN ORGANIZED HEALTH CARE EDUCATION/TRAINING PROGRAM

## 2024-12-13 PROCEDURE — 1036F TOBACCO NON-USER: CPT | Performed by: STUDENT IN AN ORGANIZED HEALTH CARE EDUCATION/TRAINING PROGRAM

## 2024-12-13 PROCEDURE — 90471 IMMUNIZATION ADMIN: CPT | Performed by: STUDENT IN AN ORGANIZED HEALTH CARE EDUCATION/TRAINING PROGRAM

## 2024-12-13 PROCEDURE — 90750 HZV VACC RECOMBINANT IM: CPT | Performed by: STUDENT IN AN ORGANIZED HEALTH CARE EDUCATION/TRAINING PROGRAM

## 2024-12-13 NOTE — PROGRESS NOTES
Subjective   Patient ID: Fani Garcia is a 49 y.o. female who presents for Hyperlipidemia, Back Pain, Nail Problem, and Prediabetes.    HPI   Patient is 57 years old female with past medical history of dyslipidemia, herpes simplex depression, fibromyalgia, who presented today for medication refill and talk about weight loss management.    Overweight  Patient was treated in the past with phentermine/topiramate but switched to semaglutide by previous provider.    Currently she is taking 2 mg dose injection weekly.   No side effects reported.  Patient did not lost weight in the past month but reviewing her chart weight was 208 lb in 2022.       Review of Systems  Are reviewed and are negative.   Objective   /80 (BP Location: Left arm, Patient Position: Sitting, BP Cuff Size: Adult)   Pulse 87   Wt 76.2 kg (168 lb)   LMP  (LMP Unknown) Comment: will sign waiver  SpO2 98%   BMI 28.84 kg/m²     Physical Exam  General: Alert and oriented. Appears well-nourished and in no acute distress.  Eyes: PERRLA. EOMI.  Head/neck: Normocephalic. Supple.  Lymphatics: No cervical lymphadenopathy.  Respiratory/Thorax: Clear to auscultation bilaterally. No wheezing.   Cardiovascular: Regular rate and rhythm. No murmurs.  Gastrointestinal: Soft, nontender, nondistended. +BS   Musculoskeletal: ROM intact. No joint swelling. Normal strength   Extremities: Warm and well perfused. No peripheral edema.  Neurological: No gross neurologic deficits.   Psychological: Appropriate mood and affect.   Skin: No visible rashes or lesions.    Assessment/Plan   Diagnoses and all orders for this visit:    Insulin resistance  Metabolic syndrome  Labs are up-to-date, done in 10/11/2024.  Continue on semaglutide 2 mg dose weekly.  Emphasized the role of healthy diet and exercise in combination with medication.      Need for shingles vaccine  -     Zoster vaccine, recombinant, adult (SHINGRIX)       Follow-up in March 2025 for shingles vaccine(  second dose) and health maintenance visit.     Sheron Rucker. MD  Family medicine resident  PGY3

## 2024-12-19 ENCOUNTER — OFFICE VISIT (OUTPATIENT)
Dept: PAIN MEDICINE | Facility: CLINIC | Age: 49
End: 2024-12-19
Payer: COMMERCIAL

## 2024-12-19 VITALS
OXYGEN SATURATION: 100 % | BODY MASS INDEX: 28.68 KG/M2 | DIASTOLIC BLOOD PRESSURE: 90 MMHG | WEIGHT: 168 LBS | RESPIRATION RATE: 16 BRPM | HEIGHT: 64 IN | SYSTOLIC BLOOD PRESSURE: 138 MMHG | HEART RATE: 57 BPM

## 2024-12-19 DIAGNOSIS — M48.07 STENOSIS OF LUMBOSACRAL SPINE: ICD-10-CM

## 2024-12-19 DIAGNOSIS — M54.16 LUMBAR RADICULOPATHY: ICD-10-CM

## 2024-12-19 DIAGNOSIS — M47.817 LUMBOSACRAL SPONDYLOSIS WITHOUT MYELOPATHY: Primary | ICD-10-CM

## 2024-12-19 DIAGNOSIS — Z98.1 S/P LUMBAR FUSION: ICD-10-CM

## 2024-12-19 DIAGNOSIS — E66.3 OVERWEIGHT (BMI 25.0-29.9): ICD-10-CM

## 2024-12-19 PROCEDURE — 3008F BODY MASS INDEX DOCD: CPT | Performed by: ANESTHESIOLOGY

## 2024-12-19 PROCEDURE — 99214 OFFICE O/P EST MOD 30 MIN: CPT | Performed by: ANESTHESIOLOGY

## 2024-12-19 PROCEDURE — 1036F TOBACCO NON-USER: CPT | Performed by: ANESTHESIOLOGY

## 2024-12-19 RX ORDER — OXYCODONE AND ACETAMINOPHEN 5; 325 MG/1; MG/1
1 TABLET ORAL EVERY 12 HOURS PRN
COMMUNITY
End: 2024-12-19 | Stop reason: SDUPTHER

## 2024-12-19 RX ORDER — OXYCODONE AND ACETAMINOPHEN 5; 325 MG/1; MG/1
1 TABLET ORAL EVERY 12 HOURS PRN
Qty: 60 TABLET | Refills: 0 | Status: SHIPPED | OUTPATIENT
Start: 2024-12-19 | End: 2025-01-18

## 2024-12-19 RX ORDER — PHENTERMINE AND TOPIRAMATE 15; 92 MG/1; MG/1
1 CAPSULE, EXTENDED RELEASE ORAL DAILY
Qty: 30 CAPSULE | Refills: 5 | Status: SHIPPED | OUTPATIENT
Start: 2024-12-19 | End: 2025-01-18

## 2024-12-19 RX ORDER — GABAPENTIN 100 MG/1
100 CAPSULE ORAL 3 TIMES DAILY
Qty: 270 CAPSULE | Refills: 2 | Status: SHIPPED | OUTPATIENT
Start: 2024-12-19 | End: 2025-03-19

## 2024-12-19 ASSESSMENT — ENCOUNTER SYMPTOMS
HEMATOLOGIC/LYMPHATIC NEGATIVE: 1
NUMBNESS: 1
ENDOCRINE NEGATIVE: 1
ARTHRALGIAS: 1
RESPIRATORY NEGATIVE: 1
MYALGIAS: 1
PSYCHIATRIC NEGATIVE: 1
GASTROINTESTINAL NEGATIVE: 1
EYES NEGATIVE: 1
CONSTITUTIONAL NEGATIVE: 1
BACK PAIN: 1
ALLERGIC/IMMUNOLOGIC NEGATIVE: 1
CARDIOVASCULAR NEGATIVE: 1

## 2024-12-19 ASSESSMENT — PAIN - FUNCTIONAL ASSESSMENT: PAIN_FUNCTIONAL_ASSESSMENT: 0-10

## 2024-12-19 ASSESSMENT — PAIN DESCRIPTION - DESCRIPTORS: DESCRIPTORS: ACHING

## 2024-12-19 ASSESSMENT — PAIN SCALES - GENERAL
PAINLEVEL_OUTOF10: 4
PAINLEVEL_OUTOF10: 4

## 2024-12-19 NOTE — PROGRESS NOTES
MEDICATION NAME: percocet  STRENGTH: 5/325mg  LAST FILL DATE: 8/10/24  DATE LAST TAKEN: 12/15/24  QUANTITY FILLED: 60  QUANTITY REMAINING: 3  COUNT COMPLETED BY: otto lemus rn, demian Malik rn      UDS LAST COMPLETED: 9/2024  CONTROLLED SUBSTANCES AGREEMENT LAST SIGNED: 9/2024  ORT LAST COMPLETED:11/2023  Modified [employer] disability form filled out annually.

## 2024-12-19 NOTE — PROGRESS NOTES
Subjective   Patient ID: Fani Garcia is a 49 y.o. female who presents for Back Pain.  Back Pain  Associated symptoms include numbness.   Patient here today for follow-up.  Patient underwent L4-5 fusion and she continues to have axial back pain that is worse with extension.  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  She continues to use her gabapentin on a daily basis.  She will also use pain medications on a as needed basis on the weekends when pain is severe.  She states that it is low in her back and her it will hurt with extended standing in 1 place or with walking she says movements will help heat heal this.  She reports no radiation of pain into her lower extremities.  She reports no numbness, tingling, weakness.  She did get a second opinion with Dr. Freeman who felt that her fusion was stable there is no additional surgery needed.    Has seen a 100 pound weight loss and has been on Qsymia and Ozempic.  She continues to do well on these medications.    Review of Systems   Constitutional: Negative.    HENT: Negative.     Eyes: Negative.    Respiratory: Negative.     Cardiovascular: Negative.    Gastrointestinal: Negative.    Endocrine: Negative.    Genitourinary: Negative.    Musculoskeletal:  Positive for arthralgias, back pain and myalgias.   Skin: Negative.    Allergic/Immunologic: Negative.    Neurological:  Positive for numbness.   Hematological: Negative.    Psychiatric/Behavioral: Negative.         Objective   Physical Exam  Vitals and nursing note reviewed.   Constitutional:       Appearance: Normal appearance.   HENT:      Head: Normocephalic and atraumatic.      Right Ear: Ear canal and external ear normal.      Left Ear: Ear canal and external ear normal.      Nose: Nose normal.      Mouth/Throat:      Mouth: Mucous  membranes are moist.      Pharynx: Oropharynx is clear.   Eyes:      Conjunctiva/sclera: Conjunctivae normal.      Pupils: Pupils are equal, round, and reactive to light.   Cardiovascular:      Rate and Rhythm: Normal rate.   Pulmonary:      Effort: Pulmonary effort is normal. No respiratory distress.   Musculoskeletal:      Cervical back: Normal range of motion and neck supple.      Lumbar back: Tenderness present. Decreased range of motion. Negative right straight leg raise test and negative left straight leg raise test.        Back:       Right hip: Tenderness (right GT) present.        Legs:       Comments: Increased pain with extension and facet loading at the top of the sacrum.   Skin:     General: Skin is warm and dry.   Neurological:      Mental Status: She is alert and oriented to person, place, and time.      Motor: Motor function is intact.      Coordination: Coordination is intact.      Gait: Gait is intact.      Deep Tendon Reflexes:      Reflex Scores:       Patellar reflexes are 2+ on the right side and 2+ on the left side.  Psychiatric:         Mood and Affect: Mood normal.         Thought Content: Thought content normal.         Assessment/Plan   Problem List Items Addressed This Visit             ICD-10-CM    S/P lumbar fusion Z98.1     Other Visit Diagnoses         Codes    Lumbosacral spondylosis without myelopathy    -  Primary M47.817             I nice discussion with the patient today our plan will be as follows.    Radiology: Most significant at L3-4 and L5-S1.    Physically: Patient will start working out we discussed her core strengthening exercises including swimming and using her stationary bike at home.    Psychologically: No issues at this time.    Medication: I will refill the patient's opioids today for 3 month.  The patient continues to see benefit and improvement in their quality of life and ability to maintain ADLs.  Patient educated about the risks of taking opioids and operating  a motor vehicle.  Patient reports no adverse side effects to current medication regimen.  Current regimen does allow patient to maintain ADLs.  Patient reports no new neurologic symptoms, new pain areas, or exacerbation in pain today.  Patient reports they are happy with current treatment care path.    [place] was reviewed and was consistent with the history.    Patient has been educated on the risks, benefits, and alternatives of controlled substances as well as the proper way to store these medications.  The patient and I discussed the nature of this medication and its side effects.  We discussed tolerance, physical dependence, psychological dependence, addiction and opioid-induced hyperalgesia.  We discussed the potential need to wean from this medication.  We discussed the availability of programs that can help with this process if necessary.  We discussed safety issues related to opioids including safe storage.  We discussed the fact that the patient should not drive an automobile or operate heavy machinery while taking this medication.  A prescription for naloxone was offered to the patient.  The patient will be re-evaluated for the need to continue opioid therapy in 60-90 days.  All previous urine drug screens and saliva drug screens were reviewed today and are compliant with the patient's prescriptive history.  Qsymia refilled today.  The Shy refilled today.    Duration: Greater than 1 year    Intervention: Patient's history and physical exam are consistent with mechanical pain above the low her fusion.  I do think she would do well with bilateral L5-S1 medial branch blocks.  If she continues to do well initially we will move towards a second diagnostic block with a goal of thermal radiofrequency ablation in the future.        Please note that this report has been produced using speech recognition software. It may contain errors related to grammar, punctuation or spelling. Electronically signed, but not  reviewed.   MRI was reviewed with the patient today patient does have facet hypertrophy above and below her fusion level.    Leander Pompa MD 12/19/24 2:08 PM

## 2025-01-09 ENCOUNTER — TELEPHONE (OUTPATIENT)
Dept: PAIN MEDICINE | Facility: CLINIC | Age: 50
End: 2025-01-09
Payer: COMMERCIAL

## 2025-01-26 PROCEDURE — RXMED WILLOW AMBULATORY MEDICATION CHARGE

## 2025-01-30 ENCOUNTER — PHARMACY VISIT (OUTPATIENT)
Dept: PHARMACY | Facility: CLINIC | Age: 50
End: 2025-01-30
Payer: COMMERCIAL

## 2025-01-30 DIAGNOSIS — I10 HYPERTENSION, UNSPECIFIED TYPE: ICD-10-CM

## 2025-01-30 RX ORDER — OLMESARTAN MEDOXOMIL 20 MG/1
20 TABLET ORAL DAILY
Qty: 30 TABLET | Refills: 0 | Status: SHIPPED | OUTPATIENT
Start: 2025-01-30 | End: 2025-01-31

## 2025-01-31 RX ORDER — OLMESARTAN MEDOXOMIL 20 MG/1
20 TABLET ORAL DAILY
Qty: 90 TABLET | Refills: 0 | Status: SHIPPED | OUTPATIENT
Start: 2025-01-31

## 2025-02-04 ENCOUNTER — TELEPHONE (OUTPATIENT)
Dept: PAIN MEDICINE | Facility: CLINIC | Age: 50
End: 2025-02-04
Payer: COMMERCIAL

## 2025-02-07 ENCOUNTER — APPOINTMENT (OUTPATIENT)
Dept: PRIMARY CARE | Facility: CLINIC | Age: 50
End: 2025-02-07
Payer: COMMERCIAL

## 2025-02-07 VITALS
HEART RATE: 68 BPM | DIASTOLIC BLOOD PRESSURE: 80 MMHG | HEIGHT: 64 IN | BODY MASS INDEX: 29.53 KG/M2 | SYSTOLIC BLOOD PRESSURE: 126 MMHG | WEIGHT: 173 LBS

## 2025-02-07 DIAGNOSIS — I10 HYPERTENSION, UNSPECIFIED TYPE: ICD-10-CM

## 2025-02-07 PROCEDURE — 3074F SYST BP LT 130 MM HG: CPT | Performed by: STUDENT IN AN ORGANIZED HEALTH CARE EDUCATION/TRAINING PROGRAM

## 2025-02-07 PROCEDURE — 99213 OFFICE O/P EST LOW 20 MIN: CPT | Performed by: STUDENT IN AN ORGANIZED HEALTH CARE EDUCATION/TRAINING PROGRAM

## 2025-02-07 PROCEDURE — 1036F TOBACCO NON-USER: CPT | Performed by: STUDENT IN AN ORGANIZED HEALTH CARE EDUCATION/TRAINING PROGRAM

## 2025-02-07 PROCEDURE — 3079F DIAST BP 80-89 MM HG: CPT | Performed by: STUDENT IN AN ORGANIZED HEALTH CARE EDUCATION/TRAINING PROGRAM

## 2025-02-07 PROCEDURE — 3008F BODY MASS INDEX DOCD: CPT | Performed by: STUDENT IN AN ORGANIZED HEALTH CARE EDUCATION/TRAINING PROGRAM

## 2025-02-07 ASSESSMENT — PATIENT HEALTH QUESTIONNAIRE - PHQ9
2. FEELING DOWN, DEPRESSED OR HOPELESS: NOT AT ALL
SUM OF ALL RESPONSES TO PHQ9 QUESTIONS 1 AND 2: 0
1. LITTLE INTEREST OR PLEASURE IN DOING THINGS: NOT AT ALL

## 2025-02-07 NOTE — PROGRESS NOTES
Subjective   Patient ID: Fani Garcia is a 49 y.o. female who presents for Hypertension.  Today she is accompanied by alone.     HPI  Fani presents today with concerns regarding elevated blood pressure. She is particularly apprehensive due to a significant family history of cardiovascular conditions, including aortic stenosis requiring valve replacement in her mother and her brother's passing due to supraventricular tachycardia (SVT).    She has been monitoring her blood pressure at home using an electronic cuff, with systolic readings ranging from 157 to 173 mmHg and diastolic readings from 105 to 113 mmHg.    Fani has a history of hypertension and was recently placed back on olmesartan 20 mg daily.    In the clinic today, her initial blood pressure reading was 126/80 mmHg, which upon recheck measured 119/90 mmHg. She denies any acute coronary symptoms.    Current Outpatient Medications on File Prior to Visit   Medication Sig Dispense Refill    aspirin 81 mg EC tablet Take 1 tablet (81 mg) by mouth once daily.      celecoxib (CeleBREX) 200 mg capsule Take 1 capsule (200 mg) by mouth 2 times a day. 60 capsule 5    cholecalciferol (Vitamin D-3) 50 MCG (2000 UT) tablet Take 1 tablet (50 mcg) by mouth once daily.      DULoxetine (Cymbalta) 60 mg DR capsule TAKE 1 CAPSULE (60 MG) BY MOUTH ONCE DAILY. DO NOT CRUSH OR CHEW. 90 capsule 1    fluoride, sodium, (PreviDent 5000 Booster Plus) 1.1 % dental paste Use As Directed      gabapentin (Neurontin) 100 mg capsule Take 1 capsule (100 mg) by mouth 3 times a day. 270 capsule 2    norethindrone (Aygestin) 5 mg tablet Take 1 tablet (5 mg) by mouth once daily. 90 tablet 3    olmesartan (BENIcar) 20 mg tablet TAKE 1 TABLET BY MOUTH EVERY DAY 90 tablet 0    omeprazole (PriLOSEC) 20 mg DR capsule TAKE 1 CAPSULE BY MOUTH EVERY DAY 30 MINUTES BEFORE MORNING MEAL 90 capsule 1    phentermine-topiramate (Qsymia) 15-92 mg capsule, ER multiphase 24 hr Take 1 capsule by mouth once  "daily. 30 capsule 5    rosuvastatin (Crestor) 10 mg tablet Take 1 tablet (10 mg) by mouth once every 24 hours. 90 tablet 1    semaglutide 2 mg/dose (8 mg/3 mL) pen injector INJECT 2MG UNDER THE SKIN ONCE WEEKLY 9 mL 2    solriamfetoL 75 mg tablet Take 75 mg by mouth once daily. 30 tablet 5    terbinafine (LamISIL) 250 mg tablet Take 1 tablet (250 mg) by mouth once daily. 90 tablet 0    valACYclovir (Valtrex) 500 mg tablet Take 1 tablet (500 mg) by mouth once daily. 90 tablet 1     No current facility-administered medications on file prior to visit.        Allergies   Allergen Reactions    Lisinopril Nausea And Vomiting and Unknown    Meperidine Hives, Rash and Unknown       Immunization History   Administered Date(s) Administered    Flu vaccine (IIV4), preservative free *Check age/dose* 10/28/2016, 11/10/2023    Flu vaccine, trivalent, preservative free, age 6 months and greater (Fluarix/Fluzone/Flulaval) 10/11/2024    Influenza, Unspecified 10/28/2016    Moderna SARS-CoV-2 Vaccination 12/31/2020, 02/19/2021, 12/17/2021    Tdap vaccine, age 7 year and older (BOOSTRIX, ADACEL) 07/24/2020    Zoster vaccine, recombinant, adult (SHINGRIX) 12/13/2024         Review of Systems  All pertinent positive symptoms are included in the history of present illness.  All other systems have been reviewed and are negative and noncontributory to this patient's current ailments.     Objective   /80   Pulse 68   Ht 1.626 m (5' 4\")   Wt 78.5 kg (173 lb)   LMP  (LMP Unknown) Comment: will sign waiver  BMI 29.70 kg/m²   BSA: 1.88 meters squared  No visits with results within 1 Month(s) from this visit.   Latest known visit with results is:   Lab on 10/11/2024   Component Date Value Ref Range Status    Estradiol 10/11/2024 <19  pg/mL Final    Follicle Stimulating Hormone 10/11/2024 <0.9  IU/L Final    FSH Ref Values  Follicular   2.0-12.0  IU/L  Mid-Cycle        12.0-25.0  IU/L  Luteal Phase      2.0-12.0  IU/L  Menopause       " 30.0-150.0  IU/L  Pre-puberty     50% Adult IU/L  Adult Male        2.0-10.0  IU/L   Infants          0.0-1.0  IU/L    Thyroid Stimulating Hormone 10/11/2024 1.33  0.44 - 3.98 mIU/L Final    Cholesterol 10/11/2024 128  0 - 199 mg/dL Final          Age      Desirable   Borderline High   High     0-19 Y     0 - 169       170 - 199     >/= 200    20-24 Y     0 - 189       190 - 224     >/= 225         >24 Y     0 - 199       200 - 239     >/= 240   **All ranges are based on fasting samples. Specific   therapeutic targets will vary based on patient-specific   cardiac risk.    Pediatric guidelines reference:Pediatrics 2011, 128(S5).Adult guidelines reference: NCEP ATPIII Guidelines,LINDSEY 2001, 258:2486-97    Venipuncture immediately after or during the administration of Metamizole may lead to falsely low results. Testing should be performed immediately prior to Metamizole dosing.    HDL-Cholesterol 10/11/2024 44.1  mg/dL Final      Age       Very Low   Low     Normal    High    0-19 Y    < 35      < 40     40-45     ----  20-24 Y    ----     < 40      >45      ----        >24 Y      ----     < 40     40-60      >60      Cholesterol/HDL Ratio 10/11/2024 2.9   Final      Ref Values  Desirable  < 3.4  High Risk  > 5.0    LDL Calculated 10/11/2024 74  <=99 mg/dL Final                                Near   Borderline      AGE      Desirable  Optimal    High     High     Very High     0-19 Y     0 - 109     ---    110-129   >/= 130     ----    20-24 Y     0 - 119     ---    120-159   >/= 160     ----      >24 Y     0 -  99   100-129  130-159   160-189     >/=190      VLDL 10/11/2024 10  0 - 40 mg/dL Final    Triglycerides 10/11/2024 49  0 - 149 mg/dL Final       Age         Desirable   Borderline High   High     Very High   0 D-90 D    19 - 174         ----         ----        ----  91 D- 9 Y     0 -  74        75 -  99     >/= 100      ----    10-19 Y     0 -  89        90 - 129     >/= 130      ----    20-24 Y     0 - 114        115 - 149     >/= 150      ----         >24 Y     0 - 149       150 - 199    200- 499    >/= 500    Venipuncture immediately after or during the administration of Metamizole may lead to falsely low results. Testing should be performed immediately prior to Metamizole dosing.    Non HDL Cholesterol 10/11/2024 84  0 - 149 mg/dL Final          Age       Desirable   Borderline High   High     Very High     0-19 Y     0 - 119       120 - 144     >/= 145    >/= 160    20-24 Y     0 - 149       150 - 189     >/= 190      ----         >24 Y    30 mg/dL above LDL Cholesterol goal      Glucose 10/11/2024 82  74 - 99 mg/dL Final    Sodium 10/11/2024 138  136 - 145 mmol/L Final    Potassium 10/11/2024 4.0  3.5 - 5.3 mmol/L Final    Chloride 10/11/2024 109 (H)  98 - 107 mmol/L Final    Bicarbonate 10/11/2024 22  21 - 32 mmol/L Final    Anion Gap 10/11/2024 11  10 - 20 mmol/L Final    Urea Nitrogen 10/11/2024 18  6 - 23 mg/dL Final    Creatinine 10/11/2024 1.02  0.50 - 1.05 mg/dL Final    eGFR 10/11/2024 67  >60 mL/min/1.73m*2 Final    Calculations of estimated GFR are performed using the 2021 CKD-EPI Study Refit equation without the race variable for the IDMS-Traceable creatinine methods.  https://jasn.asnjournals.org/content/early/2021/09/22/ASN.7845964653    Calcium 10/11/2024 9.3  8.6 - 10.3 mg/dL Final    Albumin 10/11/2024 4.3  3.4 - 5.0 g/dL Final    Alkaline Phosphatase 10/11/2024 62  33 - 110 U/L Final    Total Protein 10/11/2024 7.0  6.4 - 8.2 g/dL Final    AST 10/11/2024 19  9 - 39 U/L Final    Bilirubin, Total 10/11/2024 0.9  0.0 - 1.2 mg/dL Final    ALT 10/11/2024 17  7 - 45 U/L Final    Patients treated with Sulfasalazine may generate falsely decreased results for ALT.    WBC 10/11/2024 6.0  4.4 - 11.3 x10*3/uL Final    nRBC 10/11/2024 0.0  0.0 - 0.0 /100 WBCs Final    RBC 10/11/2024 5.11  4.00 - 5.20 x10*6/uL Final    Hemoglobin 10/11/2024 15.0  12.0 - 16.0 g/dL Final    Hematocrit 10/11/2024 47.1 (H)  36.0 - 46.0  % Final    MCV 10/11/2024 92  80 - 100 fL Final    MCH 10/11/2024 29.4  26.0 - 34.0 pg Final    MCHC 10/11/2024 31.8 (L)  32.0 - 36.0 g/dL Final    RDW 10/11/2024 13.0  11.5 - 14.5 % Final    Platelets 10/11/2024 334  150 - 450 x10*3/uL Final    Neutrophils % 10/11/2024 66.5  40.0 - 80.0 % Final    Immature Granulocytes %, Automated 10/11/2024 0.3  0.0 - 0.9 % Final    Immature Granulocyte Count (IG) includes promyelocytes, myelocytes and metamyelocytes but does not include bands. Percent differential counts (%) should be interpreted in the context of the absolute cell counts (cells/UL).    Lymphocytes % 10/11/2024 25.8  13.0 - 44.0 % Final    Monocytes % 10/11/2024 6.4  2.0 - 10.0 % Final    Eosinophils % 10/11/2024 0.5  0.0 - 6.0 % Final    Basophils % 10/11/2024 0.5  0.0 - 2.0 % Final    Neutrophils Absolute 10/11/2024 3.96  1.20 - 7.70 x10*3/uL Final    Percent differential counts (%) should be interpreted in the context of the absolute cell counts (cells/uL).    Immature Granulocytes Absolute, Au* 10/11/2024 0.02  0.00 - 0.70 x10*3/uL Final    Lymphocytes Absolute 10/11/2024 1.54  1.20 - 4.80 x10*3/uL Final    Monocytes Absolute 10/11/2024 0.38  0.10 - 1.00 x10*3/uL Final    Eosinophils Absolute 10/11/2024 0.03  0.00 - 0.70 x10*3/uL Final    Basophils Absolute 10/11/2024 0.03  0.00 - 0.10 x10*3/uL Final    Hemoglobin A1C 10/11/2024 5.0  See comment % Final    Estimated Average Glucose 10/11/2024 97  Not Established mg/dL Final    HIV 1/2 Antigen/Antibody Screen wi* 10/11/2024 Nonreactive  Nonreactive Final       Physical Exam  CONSTITUTIONAL - well nourished, well developed, looks like stated age, in no acute distress, not ill-appearing, and not tired appearing  SKIN - normal skin color and pigmentation, normal skin turgor without rash, lesions, or nodules visualized  HEAD - no trauma, normocephalic  EYES - normal external exam  CHEST -no distressed breathing, good effort  EXTREMITIES - no edema, no  deformities  NEUROLOGICAL - normal balance, normal motor, no ataxia  PSYCHIATRIC - alert, pleasant and cordial, age-appropriate      Assessment/Plan     Elevated blood pressure  I am happy to see that your blood pressure improved  Continue the current regimen of olmesartan 20 mg daily  I would like to have you monitor and record blood pressures at home  Blood pressure goal should be below 130/80, ideally 120/80    Will follow-up closely at your physical examination next month

## 2025-02-16 DIAGNOSIS — B35.1 ONYCHOMYCOSIS: ICD-10-CM

## 2025-02-16 DIAGNOSIS — I10 HYPERTENSION, UNSPECIFIED TYPE: ICD-10-CM

## 2025-02-17 RX ORDER — TERBINAFINE HYDROCHLORIDE 250 MG/1
250 TABLET ORAL DAILY
Qty: 30 TABLET | Refills: 0 | OUTPATIENT
Start: 2025-02-17

## 2025-02-17 RX ORDER — OLMESARTAN MEDOXOMIL 20 MG/1
20 TABLET ORAL DAILY
Qty: 30 TABLET | Refills: 0 | OUTPATIENT
Start: 2025-02-17

## 2025-02-25 RX ORDER — OLMESARTAN MEDOXOMIL 20 MG/1
20 TABLET ORAL DAILY
Qty: 90 TABLET | Refills: 0 | Status: SHIPPED | OUTPATIENT
Start: 2025-02-25

## 2025-03-03 DIAGNOSIS — K21.9 GASTROESOPHAGEAL REFLUX DISEASE, UNSPECIFIED WHETHER ESOPHAGITIS PRESENT: ICD-10-CM

## 2025-03-03 RX ORDER — OMEPRAZOLE 20 MG/1
CAPSULE, DELAYED RELEASE ORAL
Qty: 90 CAPSULE | Refills: 1 | Status: SHIPPED | OUTPATIENT
Start: 2025-03-03 | End: 2025-03-14 | Stop reason: SDUPTHER

## 2025-03-14 ENCOUNTER — APPOINTMENT (OUTPATIENT)
Dept: PRIMARY CARE | Facility: CLINIC | Age: 50
End: 2025-03-14
Payer: COMMERCIAL

## 2025-03-14 ENCOUNTER — APPOINTMENT (OUTPATIENT)
Dept: GASTROENTEROLOGY | Facility: HOSPITAL | Age: 50
End: 2025-03-14
Payer: COMMERCIAL

## 2025-03-14 VITALS
SYSTOLIC BLOOD PRESSURE: 104 MMHG | DIASTOLIC BLOOD PRESSURE: 80 MMHG | OXYGEN SATURATION: 99 % | BODY MASS INDEX: 30.56 KG/M2 | HEART RATE: 114 BPM | HEIGHT: 64 IN | WEIGHT: 179 LBS

## 2025-03-14 DIAGNOSIS — M54.16 LUMBAR RADICULOPATHY: ICD-10-CM

## 2025-03-14 DIAGNOSIS — K21.9 GASTROESOPHAGEAL REFLUX DISEASE, UNSPECIFIED WHETHER ESOPHAGITIS PRESENT: ICD-10-CM

## 2025-03-14 DIAGNOSIS — Z23 NEED FOR SHINGLES VACCINE: ICD-10-CM

## 2025-03-14 DIAGNOSIS — G47.33 OSA (OBSTRUCTIVE SLEEP APNEA): ICD-10-CM

## 2025-03-14 DIAGNOSIS — I10 HYPERTENSION, UNSPECIFIED TYPE: ICD-10-CM

## 2025-03-14 DIAGNOSIS — E88.810 METABOLIC SYNDROME: ICD-10-CM

## 2025-03-14 DIAGNOSIS — Z00.00 HEALTHCARE MAINTENANCE: Primary | ICD-10-CM

## 2025-03-14 DIAGNOSIS — M54.50 LOW BACK PAIN, UNSPECIFIED BACK PAIN LATERALITY, UNSPECIFIED CHRONICITY, UNSPECIFIED WHETHER SCIATICA PRESENT: ICD-10-CM

## 2025-03-14 DIAGNOSIS — F32.A DEPRESSION, UNSPECIFIED DEPRESSION TYPE: ICD-10-CM

## 2025-03-14 DIAGNOSIS — E88.819 INSULIN RESISTANCE: ICD-10-CM

## 2025-03-14 DIAGNOSIS — B00.9 HERPES: ICD-10-CM

## 2025-03-14 DIAGNOSIS — M79.7 FIBROMYALGIA: ICD-10-CM

## 2025-03-14 DIAGNOSIS — G47.10 HYPERSOMNOLENCE: ICD-10-CM

## 2025-03-14 DIAGNOSIS — R73.03 PREDIABETES: ICD-10-CM

## 2025-03-14 DIAGNOSIS — E78.5 HYPERLIPIDEMIA, UNSPECIFIED HYPERLIPIDEMIA TYPE: ICD-10-CM

## 2025-03-14 DIAGNOSIS — M48.07 STENOSIS OF LUMBOSACRAL SPINE: ICD-10-CM

## 2025-03-14 DIAGNOSIS — Z98.1 S/P LUMBAR FUSION: ICD-10-CM

## 2025-03-14 LAB
POC APPEARANCE, URINE: CLEAR
POC BILIRUBIN, URINE: NEGATIVE
POC BLOOD, URINE: NEGATIVE
POC COLOR, URINE: YELLOW
POC GLUCOSE, URINE: NEGATIVE MG/DL
POC KETONES, URINE: NEGATIVE MG/DL
POC LEUKOCYTES, URINE: NEGATIVE
POC NITRITE,URINE: NEGATIVE
POC PH, URINE: 7.5 PH
POC PROTEIN, URINE: NEGATIVE MG/DL
POC SPECIFIC GRAVITY, URINE: 1.01
POC UROBILINOGEN, URINE: 0.2 EU/DL

## 2025-03-14 RX ORDER — VALACYCLOVIR HYDROCHLORIDE 500 MG/1
500 TABLET, FILM COATED ORAL DAILY
Qty: 90 TABLET | Refills: 1 | Status: SHIPPED | OUTPATIENT
Start: 2025-03-14

## 2025-03-14 RX ORDER — OMEPRAZOLE 20 MG/1
CAPSULE, DELAYED RELEASE ORAL
Qty: 90 CAPSULE | Refills: 1 | Status: SHIPPED | OUTPATIENT
Start: 2025-03-14

## 2025-03-14 RX ORDER — CELECOXIB 200 MG/1
200 CAPSULE ORAL 2 TIMES DAILY
Qty: 60 CAPSULE | Refills: 5 | Status: SHIPPED | OUTPATIENT
Start: 2025-03-14

## 2025-03-14 RX ORDER — OLMESARTAN MEDOXOMIL 20 MG/1
20 TABLET ORAL DAILY
Qty: 90 TABLET | Refills: 1 | Status: SHIPPED | OUTPATIENT
Start: 2025-03-14

## 2025-03-14 RX ORDER — DULOXETIN HYDROCHLORIDE 60 MG/1
60 CAPSULE, DELAYED RELEASE ORAL DAILY
Qty: 90 CAPSULE | Refills: 1 | Status: SHIPPED | OUTPATIENT
Start: 2025-03-14 | End: 2025-09-10

## 2025-03-14 RX ORDER — ROSUVASTATIN CALCIUM 10 MG/1
10 TABLET, COATED ORAL EVERY 24 HOURS
Qty: 90 TABLET | Refills: 1 | Status: SHIPPED | OUTPATIENT
Start: 2025-03-14

## 2025-03-14 NOTE — PROGRESS NOTES
Subjective   Patient ID: Fani Garcia is a 49 y.o. female who presents for Annual Exam.  Today she is accompanied by alone.     HPI  1. Annual Physical   Mammogram: Last done 4/30/24 scheduled for 5/3/25  Pap: Last done 6/30/23 with 3 yr clearance   Colonoscopy: Last done 11/5/21 with 5 yr clearance   Vaccine: Due for Shringrix #2  Diet: Conscious and working on improving    Exercise: Difficult due to Lower Back pain   EtOH: Denies  Tobacco: Denies     2. Hypertension  Currently on Olmesartan 20mg  BP on intake was 104/80  Typical home BP averages 120s/60s  Patient states she is minimally lightheaded today   Denies cardiopulmonary symptoms     3. Hyperlipidemia   Currently managed with medication regiment of Rosuvastatin 10 mg oral tabs taken at night  Denies of any recent myalgias or cardiopulmonary symptoms  Lipid panel from 10/11/24 was normal.  Requesting refills and willing to do blood work in the near future    4. Herpes  Manages breakthrough symptoms with medication regiment of Valacyclovir 500 mg oral tabs  Requesting medication refill  Not currently experiencing a herpetic oral flare  Denies of any recent  symptoms    5. Depression/Fibromyalgia   Stable per the patient  Continues to take 60 mg of duloxetine/Cymbalta and Gabapentin from pain management   Denies any HI/SI    6. Weight Loss  Currently on Ozempic 2mg weekly  Stopped Qsymia  No side effects reported     7. GERD  Currently on Omeprazole 20mg  Stable with no reported side effects       Current Outpatient Medications on File Prior to Visit   Medication Sig Dispense Refill    aspirin 81 mg EC tablet Take 1 tablet (81 mg) by mouth once daily.      cholecalciferol (Vitamin D-3) 50 MCG (2000 UT) tablet Take 1 tablet (50 mcg) by mouth once daily.      fluoride, sodium, (PreviDent 5000 Booster Plus) 1.1 % dental paste Use As Directed      gabapentin (Neurontin) 100 mg capsule Take 1 capsule (100 mg) by mouth 3 times a day. 270 capsule 2     norethindrone (Aygestin) 5 mg tablet Take 1 tablet (5 mg) by mouth once daily. 90 tablet 3    semaglutide 2 mg/dose (8 mg/3 mL) pen injector INJECT 2MG UNDER THE SKIN ONCE WEEKLY 9 mL 2    solriamfetoL 75 mg tablet Take 75 mg by mouth once daily. 30 tablet 5    [DISCONTINUED] celecoxib (CeleBREX) 200 mg capsule Take 1 capsule (200 mg) by mouth 2 times a day. 60 capsule 5    [DISCONTINUED] DULoxetine (Cymbalta) 60 mg DR capsule TAKE 1 CAPSULE (60 MG) BY MOUTH ONCE DAILY. DO NOT CRUSH OR CHEW. 90 capsule 1    [DISCONTINUED] olmesartan (BENIcar) 20 mg tablet Take 1 tablet (20 mg) by mouth once daily. 90 tablet 0    [DISCONTINUED] omeprazole (PriLOSEC) 20 mg DR capsule TAKE 1 CAPSULE BY MOUTH EVERY DAY 30 MINUTES BEFORE MORNING MEAL 90 capsule 1    [DISCONTINUED] phentermine-topiramate (Qsymia) 15-92 mg capsule, ER multiphase 24 hr Take 1 capsule by mouth once daily. 30 capsule 5    [DISCONTINUED] rosuvastatin (Crestor) 10 mg tablet Take 1 tablet (10 mg) by mouth once every 24 hours. 90 tablet 1    [DISCONTINUED] terbinafine (LamISIL) 250 mg tablet Take 1 tablet (250 mg) by mouth once daily. 90 tablet 0    [DISCONTINUED] valACYclovir (Valtrex) 500 mg tablet Take 1 tablet (500 mg) by mouth once daily. 90 tablet 1     No current facility-administered medications on file prior to visit.        Allergies   Allergen Reactions    Lisinopril Nausea And Vomiting and Unknown    Meperidine Hives, Rash and Unknown       Immunization History   Administered Date(s) Administered    Flu vaccine (IIV4), preservative free *Check age/dose* 10/28/2016, 11/10/2023    Flu vaccine, trivalent, preservative free, age 6 months and greater (Fluarix/Fluzone/Flulaval) 10/11/2024    Influenza, Unspecified 10/28/2016    Moderna SARS-CoV-2 Vaccination 12/31/2020, 02/19/2021, 12/17/2021    Tdap vaccine, age 7 year and older (BOOSTRIX, ADACEL) 07/24/2020    Zoster vaccine, recombinant, adult (SHINGRIX) 12/13/2024, 03/14/2025         Review of  "Systems  All pertinent positive symptoms are included in the history of present illness.  All other systems have been reviewed and are negative and noncontributory to this patient's current ailments.     Objective   /80 (BP Location: Left arm, Patient Position: Sitting, BP Cuff Size: Adult)   Pulse (!) 114   Ht 1.626 m (5' 4\")   Wt 81.2 kg (179 lb)   LMP  (LMP Unknown) Comment: will sign waiver  SpO2 99%   BMI 30.73 kg/m²   BSA: 1.91 meters squared  Office Visit on 03/14/2025   Component Date Value Ref Range Status    POC Color, Urine 03/14/2025 Yellow  Straw, Yellow, Light-Yellow Final    POC Appearance, Urine 03/14/2025 Clear  Clear Final    POC Glucose, Urine 03/14/2025 NEGATIVE  NEGATIVE mg/dl Final    POC Bilirubin, Urine 03/14/2025 NEGATIVE  NEGATIVE Final    POC Ketones, Urine 03/14/2025 NEGATIVE  NEGATIVE mg/dl Final    POC Specific Gravity, Urine 03/14/2025 1.015  1.005 - 1.035 Final    POC Blood, Urine 03/14/2025 NEGATIVE  NEGATIVE Final    POC PH, Urine 03/14/2025 7.5  No Reference Range Established PH Final    POC Protein, Urine 03/14/2025 NEGATIVE  NEGATIVE mg/dl Final    POC Urobilinogen, Urine 03/14/2025 0.2  0.2, 1.0 EU/DL Final    Poc Nitrite, Urine 03/14/2025 NEGATIVE  NEGATIVE Final    POC Leukocytes, Urine 03/14/2025 NEGATIVE  NEGATIVE Final       Physical Exam  CONSTITUTIONAL - well nourished, well developed, looks like stated age, in no acute distress, not ill-appearing, and not tired appearing  SKIN - normal skin color and pigmentation, normal skin turgor without rash, lesions, or nodules visualized  HEAD - no trauma, normocephalic  EYES - pupils are equal and reactive to light, extraocular muscles are intact, and normal external exam  NECK - supple without rigidity, no neck mass was observed, no thyromegaly or thyroid nodules  CHEST - clear to auscultation, no wheezing, no crackles and no rales, good effort  CARDIAC - regular rate and regular rhythm, no skipped beats, no " murmur  ABDOMEN - no organomegaly, soft, nontender, nondistended, normal bowel sounds, no guarding/rebound/rigidity, negative McBurney sign and negative Larios sign  EXTREMITIES - no edema, no deformities  NEUROLOGICAL - normal gait, normal balance, normal motor, no ataxia  PSYCHIATRIC - alert, pleasant and cordial, age-appropriate  IMMUNOLOGIC - no cervical lymphadenopathy     Assessment/Plan   1. Annual Physical   Complete history and physical examination was performed  EKG reveals normal sinus rhythm without acute changes  We will notify of test results once available and make treatment recommendations accordingly  Please send a well-balanced diet and exercise regularly  Shingles vaccination was provided to you today    2. Hypertension  Continue olmesartan 20 mg daily without any changes recommended  I would like to have you monitor and record blood pressures at home  Blood pressure goal should be below 130/80, ideally 120/80    3. Hyperlipidemia   Please continue rosuvastatin at 10 mg daily without any changes  Lab work ordered and we will notify of the results and make recommendations accordingly    4. Herpes  Continue valacyclovir as prescribed  Refill sent to your pharmacy    5. Depression/Fibromyalgia   Continue the 60 mg of duloxetine without any changes  Refill sent to your pharmacy    6. Weight Loss  Continue Ozempic without any changes recommended  Refill sent to your local pharmacy  Will continue monitoring closely  Keep up the great work    7. GERD  Continue omeprazole without any changes    Please follow-up within the next 3-6 months for continued care and refills

## 2025-03-15 LAB
ALBUMIN SERPL-MCNC: 4.4 G/DL (ref 3.6–5.1)
ALP SERPL-CCNC: 57 U/L (ref 37–153)
ALT SERPL-CCNC: 14 U/L (ref 6–29)
ANION GAP SERPL CALCULATED.4IONS-SCNC: 10 MMOL/L (CALC) (ref 7–17)
AST SERPL-CCNC: 19 U/L (ref 10–35)
BASOPHILS # BLD AUTO: 31 CELLS/UL (ref 0–200)
BASOPHILS NFR BLD AUTO: 0.5 %
BILIRUB SERPL-MCNC: 0.9 MG/DL (ref 0.2–1.2)
BUN SERPL-MCNC: 14 MG/DL (ref 7–25)
CALCIUM SERPL-MCNC: 9.5 MG/DL (ref 8.6–10.4)
CHLORIDE SERPL-SCNC: 106 MMOL/L (ref 98–110)
CHOLEST SERPL-MCNC: 122 MG/DL
CHOLEST/HDLC SERPL: 2.1 (CALC)
CO2 SERPL-SCNC: 25 MMOL/L (ref 20–32)
CREAT SERPL-MCNC: 0.85 MG/DL (ref 0.5–1.03)
EGFRCR SERPLBLD CKD-EPI 2021: 83 ML/MIN/1.73M2
EOSINOPHIL # BLD AUTO: 31 CELLS/UL (ref 15–500)
EOSINOPHIL NFR BLD AUTO: 0.5 %
ERYTHROCYTE [DISTWIDTH] IN BLOOD BY AUTOMATED COUNT: 11.9 % (ref 11–15)
EST. AVERAGE GLUCOSE BLD GHB EST-MCNC: 103 MG/DL
EST. AVERAGE GLUCOSE BLD GHB EST-SCNC: 5.7 MMOL/L
GLUCOSE SERPL-MCNC: 67 MG/DL (ref 65–99)
HBA1C MFR BLD: 5.2 % OF TOTAL HGB
HCT VFR BLD AUTO: 43.7 % (ref 35–45)
HDLC SERPL-MCNC: 58 MG/DL
HGB BLD-MCNC: 14 G/DL (ref 11.7–15.5)
LDLC SERPL CALC-MCNC: 52 MG/DL (CALC)
LYMPHOCYTES # BLD AUTO: 1711 CELLS/UL (ref 850–3900)
LYMPHOCYTES NFR BLD AUTO: 27.6 %
MCH RBC QN AUTO: 29.4 PG (ref 27–33)
MCHC RBC AUTO-ENTMCNC: 32 G/DL (ref 32–36)
MCV RBC AUTO: 91.8 FL (ref 80–100)
MONOCYTES # BLD AUTO: 310 CELLS/UL (ref 200–950)
MONOCYTES NFR BLD AUTO: 5 %
NEUTROPHILS # BLD AUTO: 4117 CELLS/UL (ref 1500–7800)
NEUTROPHILS NFR BLD AUTO: 66.4 %
NONHDLC SERPL-MCNC: 64 MG/DL (CALC)
PLATELET # BLD AUTO: 339 THOUSAND/UL (ref 140–400)
PMV BLD REES-ECKER: 9.8 FL (ref 7.5–12.5)
POTASSIUM SERPL-SCNC: 4.9 MMOL/L (ref 3.5–5.3)
PROT SERPL-MCNC: 6.6 G/DL (ref 6.1–8.1)
RBC # BLD AUTO: 4.76 MILLION/UL (ref 3.8–5.1)
SODIUM SERPL-SCNC: 141 MMOL/L (ref 135–146)
TRIGL SERPL-MCNC: 41 MG/DL
TSH SERPL-ACNC: 0.89 MIU/L
WBC # BLD AUTO: 6.2 THOUSAND/UL (ref 3.8–10.8)

## 2025-03-16 NOTE — RESULT ENCOUNTER NOTE
Thyroid well within normal limits    Cholesterol looks great at 122, HDL 58, triglycerides 41, LDL 52    Sugar, kidneys, liver, electrolytes are all within normal limits    Complete blood cell count shows no anemia    Hemoglobin A1c well within normal limits

## 2025-03-17 ENCOUNTER — TELEPHONE (OUTPATIENT)
Dept: PAIN MEDICINE | Facility: CLINIC | Age: 50
End: 2025-03-17
Payer: COMMERCIAL

## 2025-03-24 ENCOUNTER — APPOINTMENT (OUTPATIENT)
Dept: RADIOLOGY | Facility: CLINIC | Age: 50
End: 2025-03-24
Payer: COMMERCIAL

## 2025-03-27 ENCOUNTER — APPOINTMENT (OUTPATIENT)
Dept: PAIN MEDICINE | Facility: CLINIC | Age: 50
End: 2025-03-27
Payer: COMMERCIAL

## 2025-03-28 DIAGNOSIS — M79.7 FIBROMYALGIA: ICD-10-CM

## 2025-03-28 DIAGNOSIS — R53.82 CHRONIC FATIGUE: ICD-10-CM

## 2025-04-07 DIAGNOSIS — J06.9 ACUTE URI: ICD-10-CM

## 2025-04-07 RX ORDER — DOXYCYCLINE 100 MG/1
100 CAPSULE ORAL 2 TIMES DAILY
Qty: 14 CAPSULE | Refills: 0 | Status: SHIPPED | OUTPATIENT
Start: 2025-04-07 | End: 2025-04-14

## 2025-04-15 ENCOUNTER — APPOINTMENT (OUTPATIENT)
Dept: OPHTHALMOLOGY | Facility: CLINIC | Age: 50
End: 2025-04-15
Payer: COMMERCIAL

## 2025-04-15 DIAGNOSIS — H52.4 PRESBYOPIA OF BOTH EYES: ICD-10-CM

## 2025-04-15 DIAGNOSIS — H16.223 KERATOCONJUNCTIVITIS SICCA OF BOTH EYES NOT SPECIFIED AS SJOGREN'S: ICD-10-CM

## 2025-04-15 DIAGNOSIS — H52.13 MYOPIA, BILATERAL: ICD-10-CM

## 2025-04-15 DIAGNOSIS — H52.223 REGULAR ASTIGMATISM OF BOTH EYES: Primary | ICD-10-CM

## 2025-04-15 ASSESSMENT — REFRACTION_CURRENTRX
OS_SPHERE: -3.00
OS_DIAMETER: 14.5
OD_BASECURVE: 8.3
OD_ADD: +1.75
OD_CYLINDER: SPHERE
OS_CYLINDER: SPHERE
OS_BASECURVE: 8.2
OS_ADD: +2.50
OD_DIAMETER: 14.5
OD_SPHERE: -2.25

## 2025-04-15 ASSESSMENT — REFRACTION_WEARINGRX
OS_AXIS: 170
OD_CYLINDER: -1.25
OS_SPHERE: -2.50
OD_SPHERE: -2.25
OD_ADD: +2.25
OS_ADD: +2.25
OD_AXIS: 180
OS_CYLINDER: -1.25

## 2025-04-15 ASSESSMENT — ENCOUNTER SYMPTOMS
CARDIOVASCULAR NEGATIVE: 0
EYES NEGATIVE: 1
ALLERGIC/IMMUNOLOGIC NEGATIVE: 0
PSYCHIATRIC NEGATIVE: 0
CONSTITUTIONAL NEGATIVE: 0
HEMATOLOGIC/LYMPHATIC NEGATIVE: 0
GASTROINTESTINAL NEGATIVE: 0
ENDOCRINE NEGATIVE: 0
MUSCULOSKELETAL NEGATIVE: 0
NEUROLOGICAL NEGATIVE: 0
RESPIRATORY NEGATIVE: 0

## 2025-04-15 ASSESSMENT — REFRACTION_MANIFEST
OS_AXIS: 112
OD_AXIS: 090
OD_CYLINDER: +1.50
OS_SPHERE: -4.25
OD_ADD: +2.25
OS_CYLINDER: +0.75
OD_SPHERE: -4.25
OS_ADD: +2.25

## 2025-04-15 ASSESSMENT — CONF VISUAL FIELD
OD_INFERIOR_NASAL_RESTRICTION: 0
OD_NORMAL: 1
OS_NORMAL: 1
OD_INFERIOR_TEMPORAL_RESTRICTION: 0
OS_SUPERIOR_TEMPORAL_RESTRICTION: 0
OS_INFERIOR_TEMPORAL_RESTRICTION: 0
OD_SUPERIOR_NASAL_RESTRICTION: 0
OS_INFERIOR_NASAL_RESTRICTION: 0
OS_SUPERIOR_NASAL_RESTRICTION: 0
METHOD: COUNTING FINGERS
OD_SUPERIOR_TEMPORAL_RESTRICTION: 0

## 2025-04-15 ASSESSMENT — VISUAL ACUITY
OS_CC+: -2
OS_CC: 20/25
CORRECTION_TYPE: CONTACTS
OD_CC: 20/20
METHOD: SNELLEN - LINEAR

## 2025-04-15 ASSESSMENT — CUP TO DISC RATIO
OS_RATIO: .3
OD_RATIO: .3

## 2025-04-15 ASSESSMENT — SLIT LAMP EXAM - LIDS
COMMENTS: TR MGD
COMMENTS: TR MGD

## 2025-04-15 ASSESSMENT — EXTERNAL EXAM - LEFT EYE: OS_EXAM: NORMAL

## 2025-04-15 ASSESSMENT — TONOMETRY
OD_IOP_MMHG: 10
IOP_METHOD: GOLDMANN APPLANATION
OS_IOP_MMHG: 10

## 2025-04-15 ASSESSMENT — EXTERNAL EXAM - RIGHT EYE: OD_EXAM: NORMAL

## 2025-04-15 NOTE — PROGRESS NOTES
Assessment/Plan   Diagnoses and all orders for this visit:  Regular astigmatism of both eyes  -     Corneal Topography - OU - Both Eyes  Presbyopia of both eyes  New spec rx released today per patient request. Ocular health wnl for age OU. Monitor 1 year or sooner prn. Refraction billed today. Pt consents to receiving glasses Rx today. Patient's/guardian's signature obtained to acknowledge and confirm that a paper copy of glasses Rx was given to patient in compliance with [facility] Eyeglass Rule. Electronic copy of Rx will also be available via [employer].      Patient habitual wearer of [employer] hybrid CLs, happy with vision and comfort in current CLs. Slight superior corneal desiccation noted OS on slit lamp evaluation today.   Lenses ordered and will mail to patient. If acceptable will order completion set w/ direct shipping to patient.     Keratoconjunctivitis sicca of both eyes not specified as Sjogren's  Stable, patient asymptomatic. Continue to monitor for change.

## 2025-04-16 ASSESSMENT — REFRACTION
OD_CYLINDER: -1.50
OD_SPHERE: -3.00
OS_AXIS: 112
OS_CYLINDER: +0.75
OS_CYLINDER: -0.75
OD_SPHERE: -2.25
OD_CYLINDER: +1.50
OD_AXIS: 090
OD_AXIS: 180
OS_AXIS: 007
OS_SPHERE: -3.25
OS_SPHERE: -3.25

## 2025-04-19 ENCOUNTER — DOCUMENTATION (OUTPATIENT)
Dept: OPHTHALMOLOGY | Facility: CLINIC | Age: 50
End: 2025-04-19
Payer: COMMERCIAL

## 2025-04-19 ASSESSMENT — REFRACTION_CURRENTRX
OD_CYLINDER: SPHERE
OD_DIAMETER: 14.5
OD_DIAMETER: 14.5
OS_CYLINDER: SPHERE
OD_SPHERE: -2.50
OS_ADD: +2.50
OS_CYLINDER: SPHERE
OD_ADD: +1.75
OS_DIAMETER: 14.5
OS_ADD: +2.25
OD_ADD: +1.75
OD_CYLINDER: SPHERE
OS_SPHERE: -3.25
OS_DIAMETER: 14.5
OD_BASECURVE: 8.3
OS_BASECURVE: 8.1
OS_BASECURVE: 8.1
OD_SPHERE: -2.25
OS_SPHERE: -2.75

## 2025-05-01 ENCOUNTER — APPOINTMENT (OUTPATIENT)
Dept: PAIN MEDICINE | Facility: CLINIC | Age: 50
End: 2025-05-01
Payer: COMMERCIAL

## 2025-05-02 ENCOUNTER — APPOINTMENT (OUTPATIENT)
Dept: SLEEP MEDICINE | Facility: CLINIC | Age: 50
End: 2025-05-02
Payer: COMMERCIAL

## 2025-05-02 ENCOUNTER — OFFICE VISIT (OUTPATIENT)
Dept: PAIN MEDICINE | Facility: CLINIC | Age: 50
End: 2025-05-02
Payer: COMMERCIAL

## 2025-05-02 VITALS
HEART RATE: 106 BPM | DIASTOLIC BLOOD PRESSURE: 90 MMHG | BODY MASS INDEX: 32.61 KG/M2 | HEIGHT: 64 IN | WEIGHT: 191 LBS | SYSTOLIC BLOOD PRESSURE: 148 MMHG | OXYGEN SATURATION: 96 %

## 2025-05-02 VITALS
SYSTOLIC BLOOD PRESSURE: 125 MMHG | BODY MASS INDEX: 32.61 KG/M2 | DIASTOLIC BLOOD PRESSURE: 82 MMHG | HEIGHT: 64 IN | HEART RATE: 84 BPM | WEIGHT: 191 LBS

## 2025-05-02 DIAGNOSIS — F51.12 INSUFFICIENT SLEEP SYNDROME: ICD-10-CM

## 2025-05-02 DIAGNOSIS — M54.16 LUMBAR RADICULOPATHY: ICD-10-CM

## 2025-05-02 DIAGNOSIS — Z98.1 S/P LUMBAR FUSION: Primary | ICD-10-CM

## 2025-05-02 DIAGNOSIS — M47.817 LUMBOSACRAL SPONDYLOSIS WITHOUT MYELOPATHY: ICD-10-CM

## 2025-05-02 DIAGNOSIS — G47.33 OSA (OBSTRUCTIVE SLEEP APNEA): Primary | ICD-10-CM

## 2025-05-02 DIAGNOSIS — G89.4 CHRONIC PAIN SYNDROME: ICD-10-CM

## 2025-05-02 DIAGNOSIS — G47.10 HYPERSOMNOLENCE: ICD-10-CM

## 2025-05-02 DIAGNOSIS — G47.33 OBSTRUCTIVE SLEEP APNEA SYNDROME IN ADULT: ICD-10-CM

## 2025-05-02 PROBLEM — M79.7 FIBROMYALGIA: Status: ACTIVE | Noted: 2025-05-02

## 2025-05-02 PROBLEM — G47.11 IDIOPATHIC HYPERSOMNOLENCE: Status: RESOLVED | Noted: 2023-10-05 | Resolved: 2025-05-02

## 2025-05-02 PROCEDURE — 99214 OFFICE O/P EST MOD 30 MIN: CPT | Performed by: STUDENT IN AN ORGANIZED HEALTH CARE EDUCATION/TRAINING PROGRAM

## 2025-05-02 PROCEDURE — 99214 OFFICE O/P EST MOD 30 MIN: CPT | Performed by: PHYSICIAN ASSISTANT

## 2025-05-02 PROCEDURE — 3008F BODY MASS INDEX DOCD: CPT | Performed by: PHYSICIAN ASSISTANT

## 2025-05-02 PROCEDURE — 1036F TOBACCO NON-USER: CPT | Performed by: STUDENT IN AN ORGANIZED HEALTH CARE EDUCATION/TRAINING PROGRAM

## 2025-05-02 PROCEDURE — G2211 COMPLEX E/M VISIT ADD ON: HCPCS | Performed by: PHYSICIAN ASSISTANT

## 2025-05-02 PROCEDURE — 3008F BODY MASS INDEX DOCD: CPT | Performed by: STUDENT IN AN ORGANIZED HEALTH CARE EDUCATION/TRAINING PROGRAM

## 2025-05-02 RX ORDER — OXYCODONE AND ACETAMINOPHEN 5; 325 MG/1; MG/1
1 TABLET ORAL EVERY 12 HOURS PRN
Qty: 60 TABLET | Refills: 0 | Status: SHIPPED | OUTPATIENT
Start: 2025-05-02 | End: 2025-06-01

## 2025-05-02 RX ORDER — OXYCODONE AND ACETAMINOPHEN 5; 325 MG/1; MG/1
1 TABLET ORAL EVERY 12 HOURS
COMMUNITY

## 2025-05-02 ASSESSMENT — SLEEP AND FATIGUE QUESTIONNAIRES
SITING INACTIVE IN A PUBLIC PLACE LIKE A CLASS ROOM OR A MOVIE THEATER: MODERATE CHANCE OF DOZING
HOW LIKELY ARE YOU TO NOD OFF OR FALL ASLEEP WHILE SITTING QUIETLY AFTER LUNCH WITHOUT ALCOHOL: SLIGHT CHANCE OF DOZING
HOW LIKELY ARE YOU TO NOD OFF OR FALL ASLEEP WHEN YOU ARE A PASSENGER IN A CAR FOR AN HOUR WITHOUT A BREAK: MODERATE CHANCE OF DOZING
HOW LIKELY ARE YOU TO NOD OFF OR FALL ASLEEP WHILE WATCHING TV: HIGH CHANCE OF DOZING
HOW LIKELY ARE YOU TO NOD OFF OR FALL ASLEEP WHILE LYING DOWN TO REST IN THE AFTERNOON WHEN CIRCUMSTANCES PERMIT: HIGH CHANCE OF DOZING
HOW LIKELY ARE YOU TO NOD OFF OR FALL ASLEEP IN A CAR, WHILE STOPPED FOR A FEW MINUTES IN TRAFFIC: WOULD NEVER DOZE
DIFFICULTY_STAYING_ASLEEP: MILD
HOW LIKELY ARE YOU TO NOD OFF OR FALL ASLEEP WHILE SITTING AND READING: HIGH CHANCE OF DOZING
SLEEP_PROBLEM_INTERFERES_DAILY_ACTIVITIES: MUCH
WORRIED_DISTRESSED_DUE_TO_SLEEP: SOMEWHAT
ESS-CHAD TOTAL SCORE: 14
SATISFACTION_WITH_CURRENT_SLEEP_PATTERN: SATISFIED
HOW LIKELY ARE YOU TO NOD OFF OR FALL ASLEEP WHILE SITTING AND TALKING TO SOMEONE: WOULD NEVER DOZE
SLEEP_PROBLEM_NOTICEABLE_TO_OTHERS: SOMEWHAT

## 2025-05-02 ASSESSMENT — PAIN SCALES - GENERAL
PAINLEVEL_OUTOF10: 5 - MODERATE PAIN
PAINLEVEL_OUTOF10: 6

## 2025-05-02 ASSESSMENT — PAIN DESCRIPTION - DESCRIPTORS: DESCRIPTORS: ACHING

## 2025-05-02 ASSESSMENT — PAIN - FUNCTIONAL ASSESSMENT: PAIN_FUNCTIONAL_ASSESSMENT: 0-10

## 2025-05-02 NOTE — ASSESSMENT & PLAN NOTE
I think this is the main cause of her symptom.  She simply needs to sleep more.  Growing from 4-5 hours of consistent sleep to 5-6 hours now, but still might be causing chronic sleep deprivation which is likely why she is feeling so sleepy  Discussed at length of the important of sufficient sleep.

## 2025-05-02 NOTE — PROGRESS NOTES
Chronic Pain Clinic Follow-Up Evaluation    Date: May 2, 2025 - 2:01 PM    Chief Complaint:   Chief Complaint   Patient presents with    Back Pain       SUBJECTIVE:    Fani Garcia is a 49 y.o. female who presents to [facility] pain management center for a follow up appointment for evaluation of pain and medication refill.    Patient is an established patient of Dr Pompa     Patient has hx of: L4/5 lumbar fusion, spinal stenosis, fibromyalgia    PMH also significant for: GTB left hip, JOSE, GERD, depression     No side effects with current medication regimen   BM every 3 days     Has CPAP. Sleep is not great, not all related to pain.     Rating pain as 6/10      Current Outpatient Medications:  Current Medications[1]    Current Anticoagulant Therapy: No    OARRS: Reviewed and appropriate     Pain medications reviewed:  yes    Past Medical History:  Medical History[2]    Past Surgical History:  Surgical History[3]    Family History:  Family History[4]    Social History:  Social History     Substance and Sexual Activity   Alcohol Use Never     Tobacco Use History[5]  Social History     Substance and Sexual Activity   Drug Use Never         Review of Systems     Physical Examination:  There were no vitals filed for this visit.  General:in no acute distress  Skin: skin color, texture, turgor normal, no rashes or lesions  HEENT: normocephalic, atraumatic, sclera non-icteric   Resp: unlabored on room air   Musculoskeletal:  Neck: Supple; good ROM.  Back: No pain on palpation of the lumbar spine. Full ROM without reproducible pain.  Straight leg raising test negative bilaterally.    Extremities: Extremities normal. No deformities, edema, or skin discoloration  Neurological:  Mental Status: alert and oriented  Gait: steady     New Imaging and Diagnostic Studies:  No    ASSESSMENT:    Fani Garcia is a 49 y.o. female with chronic low back pain, hx of L4/5 fusion. 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 She has been successful with weight loss to lessen burden on back and joints. She is stable on current medication regimen which allows her to maintain activity including ADL's and work day.     Problem List Items Addressed This Visit           ICD-10-CM       Neuro    S/P lumbar fusion - Primary Z98.1    Relevant Medications    oxyCODONE-acetaminophen (Percocet) 5-325 mg tablet    Other Relevant Orders    Opiate/Opioid/Benzo Prescription Compliance (Completed)    Lumbar radiculopathy M54.16    Relevant Orders    Opiate/Opioid/Benzo Prescription Compliance (Completed)     Other Visit Diagnoses         Codes      Chronic pain syndrome     G89.4    Relevant Orders    Opiate/Opioid/Benzo Prescription Compliance (Completed)      Lumbosacral spondylosis without myelopathy     M47.817    Relevant Medications    oxyCODONE-acetaminophen (Percocet) 5-325 mg tablet            PLAN:    Diagnostics: Not indicated at this time      Physical Therapy and Rehabilitation: Continue HEP      Psychologically: No issues to be addressed today     Medication: Continue current medication regimen. See counseling note below. Prescription for percocet sent to the pharmacy   Urine tox last collected - today  Opioid agreement signed - 9/2024  Pill count - not completed today     Duration:  years, chronic and ongoing     Intervention: None needed at this time.     7.   Follow up: For medication refill when needed     The patient continues to see benefit and improvement in their quality of life and ability to maintain ADLs.  Patient educated about the risks of taking opioids and operating a motor vehicle.  Patient reports no adverse side effects to current medication regimen.  Current regimen does allow patient to maintain ADLs.        Patient has been educated on the risks, benefits, and alternatives of controlled substances as well as the proper way to store these medications.  The patient and I  discussed the nature of this medication and its side effects.  We discussed tolerance, physical dependence, psychological dependence, addiction and opioid-induced hyperalgesia.   We discussed the fact that the patient should not drive an automobile or operate heavy machinery while taking this medication.     The above plan and management options were discussed at length with patient. Patient is in agreement with the above and verbalized understanding. It will be communicated with the referring physician via electronic record, fax, or mail.    Graciela Damon PA-C  May 2, 2025         [1]   Current Outpatient Medications   Medication Sig Dispense Refill    aspirin 81 mg EC tablet Take 1 tablet (81 mg) by mouth once daily.      celecoxib (CeleBREX) 200 mg capsule Take 1 capsule (200 mg) by mouth 2 times a day. 60 capsule 5    cholecalciferol (Vitamin D-3) 50 MCG (2000 UT) tablet Take 1 tablet (50 mcg) by mouth once daily.      DULoxetine (Cymbalta) 60 mg DR capsule Take 1 capsule (60 mg) by mouth once daily. Do not crush or chew. 90 capsule 1    fluoride, sodium, (PreviDent 5000 Booster Plus) 1.1 % dental paste Use As Directed      norethindrone (Aygestin) 5 mg tablet Take 1 tablet (5 mg) by mouth once daily. 90 tablet 3    olmesartan (BENIcar) 20 mg tablet Take 1 tablet (20 mg) by mouth once daily. 90 tablet 1    omeprazole (PriLOSEC) 20 mg DR capsule TAKE 1 CAPSULE BY MOUTH EVERY DAY 30 MINUTES BEFORE MORNING MEAL 90 capsule 1    rosuvastatin (Crestor) 10 mg tablet Take 1 tablet (10 mg) by mouth once every 24 hours. 90 tablet 1    valACYclovir (Valtrex) 500 mg tablet Take 1 tablet (500 mg) by mouth once daily. 90 tablet 1    gabapentin (Neurontin) 100 mg capsule Take 1 capsule (100 mg) by mouth 3 times a day. 270 capsule 2    semaglutide 2 mg/dose (8 mg/3 mL) pen injector INJECT 2MG UNDER THE SKIN ONCE WEEKLY 9 mL 2    solriamfetoL 75 mg tablet Take 75 mg by mouth once daily. 30 tablet 5     No current  facility-administered medications for this visit.   [2]   Past Medical History:  Diagnosis Date    Acetabular labrum tear 10/05/2023    Acute pharyngitis, unspecified 03/19/2014    Sorethroat    Acute vaginitis 01/15/2015    Bacterial vaginosis    Benign essential hypertension 10/05/2023    Encounter for gynecological examination (general) (routine) without abnormal findings 12/11/2020    Encounter for gynecological examination    Myopia, unspecified eye 11/11/2016    Myopia with astigmatism and presbyopia    Nystagmus     Obesity, unspecified 09/02/2020    Obesity (BMI 30-39.9)    Obstructive sleep apnea (adult) (pediatric) 10/02/2015    Obstructive sleep apnea    Other pulmonary embolism without acute cor pulmonale 07/20/2013    Pulmonary embolism    Otitis media, unspecified, left ear 03/19/2014    Acute left otitis media    Personal history of other diseases of the circulatory system     History of hypertension    Personal history of other diseases of the digestive system 03/26/2019    History of gastroesophageal reflux (GERD)    Personal history of other diseases of the musculoskeletal system and connective tissue 07/24/2020    History of fibromyalgia    Personal history of other diseases of the musculoskeletal system and connective tissue 01/20/2020    History of muscle weakness    Personal history of other diseases of the nervous system and sense organs 04/28/2014    History of earache    Personal history of other diseases of the respiratory system 03/25/2015    History of influenza    Personal history of other diseases of the respiratory system 03/19/2014    History of streptococcal pharyngitis    Personal history of other diseases of the respiratory system 07/22/2013    History of acute pharyngitis    Personal history of other endocrine, nutritional and metabolic disease 08/12/2021    History of morbid obesity    Personal history of other endocrine, nutritional and metabolic disease 12/04/2020    History of  morbid obesity    Personal history of other infectious and parasitic diseases     History of herpes simplex infection    Personal history of other specified conditions 2020    History of palpitations    Personal history of other specified conditions 2019    History of dizziness    Personal history of other specified conditions 2020    History of fatigue    Plantar fascial fibromatosis 2015    Plantar fasciitis of left foot    Pulmonary embolism 2009    Rash and other nonspecific skin eruption 2020    Rash of neck    Shortness of breath 2020    SOB (shortness of breath) on exertion   [3]   Past Surgical History:  Procedure Laterality Date     SECTION, CLASSIC  10/02/2015     Section    DILATION AND CURETTAGE OF UTERUS  2013    Dilation And Curettage    LAPAROSCOPY DIAGNOSTIC / BIOPSY / ASPIRATION / LYSIS  2013    Exploratory Laparoscopy   [4]   Family History  Problem Relation Name Age of Onset    Other (Anxiety) Mother Alisson Norman     Other (Cardiac disorder) Mother Alisson Norman     Depression Mother Alisson Norman     Hyperlipidemia Mother Alisson Norman     Hypertension Mother Alisson Norman     Heart attack Mother Alisson Norman     Osteoporosis Mother Alisson Norman     COPD Mother Alisson Norman     Heart disease Mother Alisson Norman     ALS Father      Colon cancer Brother Sathya Norman     Diabetes Brother Sathya Norman     Hyperlipidemia Brother Sathya Norman     Hypertension Brother Sathya Norman     Cancer Brother Sathya Norman     Depression Brother Sathya Norman     Heart disease Brother Sathya Norman     Kidney disease Brother Sathya Norman     Diabetes Maternal Grandmother      Ovarian cancer Maternal Grandmother      Diabetes Maternal Grandfather      Liver cancer Maternal Grandfather      Diabetes Paternal Grandmother      Diabetes Paternal Grandfather      Hypertension Brother Mayco Austin      Heart disease Brother Mayco Austin     Hyperlipidemia Brother Mayco Austin     Hypertension Brother Tereso Norman     Hyperlipidemia Brother Tereso Norman    [5]   Social History  Tobacco Use   Smoking Status Never   Smokeless Tobacco Never

## 2025-05-02 NOTE — ASSESSMENT & PLAN NOTE
Good compliance with new setting  Though better than before, usage could still be extended, and so can her sleep duration  Renew supply order

## 2025-05-02 NOTE — PROGRESS NOTES
Patient: Fani Garcia    [id number]  : 1975 -- AGE 49 y.o.    Provider: David James MD     Location [facility]   Service Date: 2025              [facility]  Followup Visit Note     Assessment/Plan   Ms. Garcia is a 49 y.o. female and she returns in followup to the [facility] for the problems listed below on 25   Problem List, Orders, Assessment, Recommendations:  Problem List Items Addressed This Visit           ICD-10-CM    Hypersomnolence G47.10    Patient has excessive daytime sleepiness despite being compliant with PAP therapy  Sunosi 75 mg daily was helping at first, but has lost its effectiveness recently.  We will increase to 75 mg twice a day.         Relevant Medications    solriamfetoL 75 mg tablet    Obstructive sleep apnea syndrome in adult G47.33    Good compliance with new setting  Though better than before, usage could still be extended, and so can her sleep duration  Renew supply order         Insufficient sleep syndrome F51.12    I think this is the main cause of her symptom.  She simply needs to sleep more.  Growing from 4-5 hours of consistent sleep to 5-6 hours now, but still might be causing chronic sleep deprivation which is likely why she is feeling so sleepy  Discussed at length of the important of sufficient sleep.          Other Visit Diagnoses         Codes      JOSE (obstructive sleep apnea)    -  Primary G47.33    Relevant Medications    solriamfetoL 75 mg tablet    Other Relevant Orders    Follow Up In Adult Sleep Medicine          Disposition  Return to clinic in 12 months          HISTORY OF PRESENT ILLNESS     HISTORY OF PRESENT ILLNESS   Fani Garcia is a 49 y.o. female with history of JOSE and Excessive Daytime Sleepiness presents to [facility] for followup.     Assessment and plan from last visit:   Ms. Garcia is a 49 y.o. female and she returns in  followup to the [facility] for the problems listed below on 11/01/24      Problem List, Orders, Assessment, Recommendations:  Problem List Items Addressed This Visit               ICD-10-CM     Hypersomnolence G47.10       Patient has excessive daytime sleepiness despite being compliant with PAP therapy  We will try Sunosi starting at 75 mg daily to see how that helps           Relevant Medications     solriamfetoL 75 mg tablet     Other Relevant Orders     Follow Up In Adult Sleep Medicine     Obstructive sleep apnea syndrome in adult G47.33       Good compliance with new setting  However, usage could be extended, and so can her sleep duration  Renew supply order           Insufficient sleep syndrome F51.12       I think this is the main cause of her symptom.  She simply needs to sleep more.  4-5 hours of consistent sleep causing chronic sleep deprivation is likely why she is feeling so sleepy  Discussed at length of the important of sufficient sleep.            Other Visit Diagnoses           Codes     JOSE (obstructive sleep apnea)    -  Primary G47.33     Relevant Medications     solriamfetoL 75 mg tablet     Other Relevant Orders     Follow Up In Adult Sleep Medicine     Positive Airway Pressure (PAP) Therapy                Disposition     Return to clinic in 6 months    Current History    On today's visit, the patient reports that she has been doing better now.  She is taking less time to do her notes and she is getting one more hour of sleep on workdays compared to before.  She is still working on getting more sleep.  However, 75 mg of Sunosi was working well initially, but has gradually lost its effectiveness.     PAP Info  DME COMPANY: [employer]  Issues with PAP Therapy?: None  Perceived Benefits of PAP therapy: refreshing sleep    PAP Adherence  PAP Adherence : A PAP adherence download was obtained and data was reviewed personally today in clinic., Screenshot of  "PAP download is attached below    Daytime Symptoms  Patient reports: excessively sleepy during the day  Patient denies: feeling sleepy when driving  Napping habit(Optional): Yes, patient naps irregularly for  minutes. Patient reports naps are: refreshing  Fatigue concerns: Patient struggles to carry out day to day responsibilities.    ESS: 14  JIMMIE: 10  FOSQ: 20    PHYSICAL EXAM     VITAL SIGNS: /82 (BP Location: Right arm, Patient Position: Sitting, BP Cuff Size: Adult)   Pulse 84   Ht 1.626 m (5' 4\")   Wt 86.6 kg (191 lb)   LMP  (LMP Unknown) Comment: will sign waiver  BMI 32.79 kg/m²      PREVIOUS WEIGHTS:  Wt Readings from Last 3 Encounters:   05/02/25 86.6 kg (191 lb)   05/02/25 86.6 kg (191 lb)   03/14/25 81.2 kg (179 lb)     "

## 2025-05-02 NOTE — ASSESSMENT & PLAN NOTE
Patient has excessive daytime sleepiness despite being compliant with PAP therapy  Sunosi 75 mg daily was helping at first, but has lost its effectiveness recently.  We will increase to 75 mg twice a day.

## 2025-05-03 ENCOUNTER — APPOINTMENT (OUTPATIENT)
Dept: RADIOLOGY | Facility: CLINIC | Age: 50
End: 2025-05-03
Payer: COMMERCIAL

## 2025-05-05 LAB
1OH-MIDAZOLAM UR-MCNC: NEGATIVE NG/ML
7AMINOCLONAZEPAM UR-MCNC: NEGATIVE NG/ML
A-OH ALPRAZ UR-MCNC: NEGATIVE NG/ML
A-OH-TRIAZOLAM UR-MCNC: NEGATIVE NG/ML
AMPHETAMINES UR QL: NEGATIVE NG/ML
BARBITURATES UR QL: NEGATIVE NG/ML
BZE UR QL: NEGATIVE NG/ML
CODEINE UR-MCNC: NEGATIVE NG/ML
CREAT UR-MCNC: 146.4 MG/DL
DRUG SCREEN COMMENT UR-IMP: ABNORMAL
EDDP UR-MCNC: NEGATIVE NG/ML
FENTANYL UR-MCNC: NEGATIVE NG/ML
HYDROCODONE UR-MCNC: NEGATIVE NG/ML
HYDROMORPHONE UR-MCNC: NEGATIVE NG/ML
LORAZEPAM UR-MCNC: NEGATIVE NG/ML
METHADONE UR-MCNC: NEGATIVE NG/ML
MORPHINE UR-MCNC: NEGATIVE NG/ML
NORDIAZEPAM UR-MCNC: NEGATIVE NG/ML
NORFENTANYL UR-MCNC: NEGATIVE NG/ML
NORHYDROCODONE UR CFM-MCNC: NEGATIVE NG/ML
NOROXYCODONE UR CFM-MCNC: 201 NG/ML
NORTRAMADOL UR-MCNC: NEGATIVE NG/ML
OH-ETHYLFLURAZ UR-MCNC: NEGATIVE NG/ML
OXAZEPAM UR-MCNC: NEGATIVE NG/ML
OXIDANTS UR QL: NEGATIVE MCG/ML
OXYCODONE UR CFM-MCNC: 58 NG/ML
OXYMORPHONE UR CFM-MCNC: NEGATIVE NG/ML
PCP UR QL: NEGATIVE NG/ML
PH UR: 6.7 [PH] (ref 4.5–9)
QUEST 6 ACETYLMORPHINE: NEGATIVE NG/ML
QUEST NOTES AND COMMENTS: ABNORMAL
QUEST ZOLPIDEM: NEGATIVE NG/ML
TEMAZEPAM UR-MCNC: NEGATIVE NG/ML
THC UR QL: NEGATIVE NG/ML
TRAMADOL UR-MCNC: NEGATIVE NG/ML
ZOLPIDEM PHENYL-4-CARB UR CFM-MCNC: NEGATIVE NG/ML

## 2025-05-09 ENCOUNTER — APPOINTMENT (OUTPATIENT)
Dept: RADIOLOGY | Facility: CLINIC | Age: 50
End: 2025-05-09
Payer: COMMERCIAL

## 2025-05-10 ENCOUNTER — PATIENT MESSAGE (OUTPATIENT)
Dept: SLEEP MEDICINE | Facility: CLINIC | Age: 50
End: 2025-05-10
Payer: COMMERCIAL

## 2025-05-10 DIAGNOSIS — G47.19 EXCESSIVE DAYTIME SLEEPINESS: ICD-10-CM

## 2025-05-10 DIAGNOSIS — G47.10 HYPERSOMNOLENCE: ICD-10-CM

## 2025-05-16 ENCOUNTER — APPOINTMENT (OUTPATIENT)
Dept: RADIOLOGY | Facility: CLINIC | Age: 50
End: 2025-05-16
Payer: COMMERCIAL

## 2025-05-21 PROCEDURE — RXMED WILLOW AMBULATORY MEDICATION CHARGE

## 2025-05-22 ENCOUNTER — APPOINTMENT (OUTPATIENT)
Dept: RADIOLOGY | Facility: HOSPITAL | Age: 50
End: 2025-05-22
Payer: COMMERCIAL

## 2025-05-22 DIAGNOSIS — Z12.31 ENCOUNTER FOR SCREENING MAMMOGRAM FOR MALIGNANT NEOPLASM OF BREAST: ICD-10-CM

## 2025-05-22 PROCEDURE — 77067 SCR MAMMO BI INCL CAD: CPT

## 2025-05-23 ENCOUNTER — PHARMACY VISIT (OUTPATIENT)
Dept: PHARMACY | Facility: CLINIC | Age: 50
End: 2025-05-23
Payer: COMMERCIAL

## 2025-06-02 NOTE — POST-PROCEDURE NOTE
Band aid dry and intact. No neuro deficits. Ready for discharge.     Breann ordered a medication and sent it to patients pharmacy on 5/31/25

## 2025-06-12 ENCOUNTER — APPOINTMENT (OUTPATIENT)
Dept: INTEGRATIVE MEDICINE | Facility: CLINIC | Age: 50
End: 2025-06-12
Payer: COMMERCIAL

## 2025-06-12 DIAGNOSIS — G47.00 INSOMNIA: ICD-10-CM

## 2025-06-12 DIAGNOSIS — M54.59 OTHER LOW BACK PAIN: Primary | ICD-10-CM

## 2025-06-12 DIAGNOSIS — M25.50 JOINT PAIN: ICD-10-CM

## 2025-06-12 PROCEDURE — 99202 OFFICE O/P NEW SF 15 MIN: CPT | Performed by: ACUPUNCTURIST

## 2025-06-12 PROCEDURE — 97811 ACUP 1/> W/O ESTIM EA ADD 15: CPT | Performed by: ACUPUNCTURIST

## 2025-06-12 PROCEDURE — 97810 ACUP 1/> WO ESTIM 1ST 15 MIN: CPT | Performed by: ACUPUNCTURIST

## 2025-06-12 NOTE — PROGRESS NOTES
Acupuncture Visit:     Subjective   Patient ID: Fani Garcia is a 49 y.o. female who presents for No chief complaint on file.  Pt seeking support for chronic insomnia, low back pain, and fibromyalgia.  The latter inhibits her ability to walk.  She feel like ants are walking along anterior legs.   By days end she experiences joint pain  in ankle hips and knees described as achy and stiff.  She also reports fatigue.      GERD is regulated.     Patient has hx of: L4/5 lumbar fusion, spinal stenosis, fibromyalgia     PMH also significant for: GTB left hip, JOSE, GERD, depression      No side effects with current medication regimen   BM every 3 days      Has CPAP. Sleep is not great, not all related to pain.      Rating pain as 6/10          Session Information  Is this acupuncture treatment being billed to the patient's insurance company: Yes  This is visit number: 1  Initial Acupuncture Treatment date: 06/12/25  Name of Insurance Company:  employee  Name of Supervising Physician: NA  Visit Type: New patient         Review of Systems         Provider reviewed plan for the acupuncture session, precautions and contraindications. Patient/guardian/hospital staff has given consent to treat with full understanding of what to expect during the session. Before acupuncture began, provider explained to the patient to communicate at any time if the procedure was causing discomfort past their tolerance level. Patient agreed to advise acupuncturist. The acupuncturist counseled the patient on the risks of acupuncture treatment including pain, infection, bleeding, and no relief of pain. The patient was positioned comfortably. There was no evidence of infection at the site of needle insertions.    Objective   Physical Exam    Acupuncture Physical Exam  Tongue Color: Dusky, Dark purple    Treatment Plan  Treatment Goals: Pain management (promote sleep)    Acupuncture Treatment  Needle Guage: 40 guage /.16/ Red iriotoniel  Body Points -  Bilateral: lr 3, li 4, lr 8, k 6, 27, st qi x1, immune x1  Other Techniques Utilized: TDP Lamp  TDP Lamp Descripton: noble  Needle Count In: 14  Needle Count Out: 14  Needle Retention Time (min): 30 minutes  Total Face to Face Time (min): 25 minutes              Assessment/Plan

## 2025-06-26 ENCOUNTER — HOSPITAL ENCOUNTER (OUTPATIENT)
Dept: RADIOLOGY | Facility: HOSPITAL | Age: 50
Discharge: HOME | End: 2025-06-26
Payer: COMMERCIAL

## 2025-06-26 DIAGNOSIS — R73.03 PREDIABETES: ICD-10-CM

## 2025-06-26 DIAGNOSIS — E78.5 HYPERLIPIDEMIA, UNSPECIFIED HYPERLIPIDEMIA TYPE: ICD-10-CM

## 2025-06-26 PROCEDURE — 75571 CT HRT W/O DYE W/CA TEST: CPT

## 2025-06-27 ENCOUNTER — HOSPITAL ENCOUNTER (OUTPATIENT)
Dept: GASTROENTEROLOGY | Facility: HOSPITAL | Age: 50
Discharge: HOME | End: 2025-06-27
Payer: COMMERCIAL

## 2025-06-27 VITALS
DIASTOLIC BLOOD PRESSURE: 88 MMHG | OXYGEN SATURATION: 100 % | RESPIRATION RATE: 16 BRPM | HEART RATE: 91 BPM | HEIGHT: 64 IN | SYSTOLIC BLOOD PRESSURE: 127 MMHG | BODY MASS INDEX: 32.61 KG/M2 | WEIGHT: 191 LBS | TEMPERATURE: 96.8 F

## 2025-06-27 DIAGNOSIS — M47.817 LUMBOSACRAL SPONDYLOSIS WITHOUT MYELOPATHY: ICD-10-CM

## 2025-06-27 PROCEDURE — 64493 INJ PARAVERT F JNT L/S 1 LEV: CPT | Mod: 50 | Performed by: ANESTHESIOLOGY

## 2025-06-27 PROCEDURE — 2500000004 HC RX 250 GENERAL PHARMACY W/ HCPCS (ALT 636 FOR OP/ED): Performed by: ANESTHESIOLOGY

## 2025-06-27 RX ORDER — PHENTERMINE HYDROCHLORIDE 37.5 MG/1
37.5 TABLET ORAL
COMMUNITY

## 2025-06-27 RX ORDER — BUPIVACAINE HYDROCHLORIDE 5 MG/ML
INJECTION, SOLUTION PERINEURAL AS NEEDED
Status: COMPLETED | OUTPATIENT
Start: 2025-06-27 | End: 2025-06-27

## 2025-06-27 RX ORDER — LIDOCAINE HYDROCHLORIDE 20 MG/ML
INJECTION, SOLUTION EPIDURAL; INFILTRATION; INTRACAUDAL; PERINEURAL AS NEEDED
Status: COMPLETED | OUTPATIENT
Start: 2025-06-27 | End: 2025-06-27

## 2025-06-27 RX ADMIN — BUPIVACAINE HYDROCHLORIDE 1 ML: 5 INJECTION, SOLUTION PERINEURAL at 12:40

## 2025-06-27 RX ADMIN — LIDOCAINE HYDROCHLORIDE 10 ML: 20 INJECTION, SOLUTION EPIDURAL; INFILTRATION; INTRACAUDAL; PERINEURAL at 12:39

## 2025-06-27 ASSESSMENT — PAIN SCALES - GENERAL
PAINLEVEL_OUTOF10: 0 - NO PAIN
PAINLEVEL_OUTOF10: 4

## 2025-06-27 ASSESSMENT — PAIN - FUNCTIONAL ASSESSMENT
PAIN_FUNCTIONAL_ASSESSMENT: 0-10
PAIN_FUNCTIONAL_ASSESSMENT: 0-10

## 2025-06-27 ASSESSMENT — ENCOUNTER SYMPTOMS
HEMATOLOGIC/LYMPHATIC NEGATIVE: 1
NEUROLOGICAL NEGATIVE: 1
CARDIOVASCULAR NEGATIVE: 1
CONSTITUTIONAL NEGATIVE: 1
ALLERGIC/IMMUNOLOGIC NEGATIVE: 1
BACK PAIN: 1
ENDOCRINE NEGATIVE: 1
RESPIRATORY NEGATIVE: 1
EYES NEGATIVE: 1
GASTROINTESTINAL NEGATIVE: 1
PSYCHIATRIC NEGATIVE: 1

## 2025-06-27 NOTE — RESULT ENCOUNTER NOTE
CT coronary score shows a value of 0 which places the patient in the lowest risk stratification for any cardiovascular disease

## 2025-06-27 NOTE — DISCHARGE INSTRUCTIONS
DISCHARGE INSTRUCTIONS FOR INJECTIONS     You underwent lumbar medial branch block today    Aftermost injections, it is recommended that you relax and limit your activity for the remainder of the day unless you have been told otherwise by your pain physician.  You should not drive a car, operate machinery, or make important legal decisions unless otherwise directed by your pain physician.  You may resume your normal activity, including exercise, tomorrow.      Keep a written pain diary of how much pain relief you experienced following the injection procedure and the length of time of pain relief you experienced pain relief. Following diagnostic injections like medial branch nerve blocks, sacroiliac joint blocks, stellate ganglion injections and other blocks, it is very important you record the specific amount of pain relief you experienced immediately after the injectionand how long it lasted. Your doctor will ask you for this information at your follow up visit.     For all injections, please keep the injection site dry and inspect the site for a couple of days. You may remove the Band-Aid the day of the injection at any time.     Some discomfort, bruising or slight swelling may occur at the injection site. This is not abnormal if it occurs.  If needed you may:    -Take over the counter medication such as Tylenol or Motrin.   -Apply an ice pack for 30 minutes, 2 to 3 times a day for the first 24 hours.     You may shower today; no soaking baths, hot tubs, whirlpools or swimming pools for two days.      If you are given steroids in your injection, it may take 3-5 days for the steroid medication to take effect. You may notice a worsening of your symptoms for 1-2 days after the injection. This is not abnormal.  You may use acetaminophen, ibuprofen, or prescription medication that your doctor may have prescribed for you if you need to do so.     A few common side effects of steroids include facial flushing, sweating,  restlessness, irritability,difficulty sleeping, increase in blood sugar, and increased blood pressure. If you have diabetes, please monitor your blood sugar at least once a day for at least 5 days. If you have poorly controlled high blood pressure, monitoryour blood pressure for at least 2 days and contact your primary care physician if these numbers are unusually high for you.      If you take aspirin or non-steroidal anti-inflammatory drugs (examples are Motrin, Advil, ibuprofen, Naprosyn, Voltaren, Relafen, etc.) you may restart these this evening, but stop taking it 3 days before your next appointment, unless instructed otherwiseby your physician.      You do not need to discontinue non-aspirin-containing pain medications prior to an injection (examples: Celebrex, tramadol, hydrocodone and acetaminophen).      If you take a blood thinning medication (Coumadin, Lovenox, Fragmin,Ticlid, Plavix, Pradaxa, etc.), please discuss this with your primary care physician/cardiologist and your pain physician. These medications MUST be discontinued before you can have an injection safely, without the risk of uncontrolled bleeding. If these medications are not discontinued for an appropriate period of time, you will not be able to receivean injection.      If you are taking Coumadin, please have your INR checked the morning of your procedure and bringthe result to your appointment unless otherwise instructed. If your INR is over 1.2, your injection may need to be rescheduled to avoid uncontrolled bleeding from the needle placement.     Call [facility] at [phone] between 8am-4pm Monday - Friday if you are experiencing the following:    If you received an epidural or spinal injection:    -Headache that doesnot go away with medicine, is worse when sitting or standing up, and is greatly relieved upon lying down.   -Severe pain worse than or different than your baseline pain.   -Chills or fever (101º F or  greater).   -Drainage or signs of infection at the injection site     Go directly to the Emergency Department if you are experiencing the following and received an epidural or spinal injection:   -Abrupt weakness or progressive weakness in your legs that starts after you leave the clinic.   -Abrupt severe or worsening numbness in your legs.   -Inability to urinate after the injection or loss of bowel or bladder control without the urge to defecate or urinate.     If you have a clinical question that cannot wait until your next appointment, please call [phone] between 8am-4pm Monday - Friday or send a [employer] message. We do our best to return all non-emergency messages within 24 hours, Monday - Friday. A nurse or physician will return your message.      If you need to cancel an appointment, please call the scheduling staff at [phone] during normal business hours or leave a message at least 24 hours in advance.     If you are going to be sedated for your next procedure, you MUST have responsible adult who can legally drive accompany you home. You cannot eat or drink for eight hours prior to the planned procedure if you are going to receive sedation. You may take your non-blood thinning medications with a small sip of water.

## 2025-07-10 ENCOUNTER — TELEMEDICINE (OUTPATIENT)
Facility: CLINIC | Age: 50
End: 2025-07-10
Payer: COMMERCIAL

## 2025-07-10 DIAGNOSIS — M47.817 LUMBOSACRAL SPONDYLOSIS WITHOUT MYELOPATHY: Primary | ICD-10-CM

## 2025-07-10 PROCEDURE — 1036F TOBACCO NON-USER: CPT | Performed by: ANESTHESIOLOGY

## 2025-07-10 PROCEDURE — 99213 OFFICE O/P EST LOW 20 MIN: CPT | Performed by: ANESTHESIOLOGY

## 2025-07-10 ASSESSMENT — ENCOUNTER SYMPTOMS
GASTROINTESTINAL NEGATIVE: 1
ALLERGIC/IMMUNOLOGIC NEGATIVE: 1
EYES NEGATIVE: 1
ENDOCRINE NEGATIVE: 1
BACK PAIN: 1
CONSTITUTIONAL NEGATIVE: 1
NEUROLOGICAL NEGATIVE: 1
PSYCHIATRIC NEGATIVE: 1
CARDIOVASCULAR NEGATIVE: 1
RESPIRATORY NEGATIVE: 1
HEMATOLOGIC/LYMPHATIC NEGATIVE: 1

## 2025-07-10 ASSESSMENT — PAIN SCALES - GENERAL
PAINLEVEL_OUTOF10: 5
PAINLEVEL_OUTOF10: 5 - MODERATE PAIN

## 2025-07-10 ASSESSMENT — PAIN - FUNCTIONAL ASSESSMENT: PAIN_FUNCTIONAL_ASSESSMENT: 0-10

## 2025-07-10 NOTE — PROGRESS NOTES
Subjective   Patient ID: Fani Garcia is a 49 y.o. female who presents for Back Pain.  Back Pain      Patient here today for follow-up from her bilateral L5-S1 medial branch block.  Patient reported 100% pain relief for 24 hours and then all of her pain came back in her low back.  She would like to move forward with the second set of diagnostic blocks at this level with a goal of radiofrequency ablation in the future.    Pain Score:   5   Virtual or Telephone Consent    An interactive audio and video telecommunication system which permits real time communications between the patient (at the originating site) and provider (at the distant site) was utilized to provide this telehealth service.   Verbal consent was requested and obtained from Fani Garcia on this date, 07/10/25 for a telehealth visit and the patient's location was confirmed at the time of the visit.    Review of Systems   Constitutional: Negative.    HENT: Negative.     Eyes: Negative.    Respiratory: Negative.     Cardiovascular: Negative.    Gastrointestinal: Negative.    Endocrine: Negative.    Genitourinary: Negative.    Musculoskeletal:  Positive for back pain.   Skin: Negative.    Allergic/Immunologic: Negative.    Neurological: Negative.    Hematological: Negative.    Psychiatric/Behavioral: Negative.         Objective   Physical Exam  Vitals and nursing note reviewed.   Constitutional:       Appearance: Normal appearance.   HENT:      Head: Normocephalic.   Neurological:      Mental Status: She is alert.         Assessment/Plan   Problem List Items Addressed This Visit    None  Visit Diagnoses         Codes      Lumbosacral spondylosis without myelopathy    -  Primary M47.817    Relevant Orders     Medial Nerve Branch Block    [place] pain [facility]             Patient underwent bilateral L5-S1 medial branch block under fluoroscopic guidance.  She did achieve 100% pain relief for 24 hours following the block.  We will move forward with a second  set of bilateral L5-S1 medial branch blocks under fluoroscopic guidance.  If she does get equal relief we will move forward with thermal radiofrequency ablation in the future.    Leander Pompa MD 07/10/25 3:55 PM

## 2025-08-04 PROCEDURE — RXMED WILLOW AMBULATORY MEDICATION CHARGE

## 2025-08-07 ENCOUNTER — APPOINTMENT (OUTPATIENT)
Dept: INTEGRATIVE MEDICINE | Facility: CLINIC | Age: 50
End: 2025-08-07
Payer: COMMERCIAL

## 2025-08-07 ENCOUNTER — PHARMACY VISIT (OUTPATIENT)
Dept: PHARMACY | Facility: CLINIC | Age: 50
End: 2025-08-07
Payer: COMMERCIAL

## 2025-08-07 ENCOUNTER — OFFICE VISIT (OUTPATIENT)
Facility: CLINIC | Age: 50
End: 2025-08-07
Payer: COMMERCIAL

## 2025-08-07 VITALS
HEIGHT: 64 IN | SYSTOLIC BLOOD PRESSURE: 120 MMHG | RESPIRATION RATE: 16 BRPM | BODY MASS INDEX: 32.61 KG/M2 | DIASTOLIC BLOOD PRESSURE: 70 MMHG | WEIGHT: 191 LBS

## 2025-08-07 DIAGNOSIS — M47.817 SPONDYLOSIS WITHOUT MYELOPATHY OR RADICULOPATHY, LUMBOSACRAL REGION: Primary | ICD-10-CM

## 2025-08-07 DIAGNOSIS — G89.29 OTHER CHRONIC PAIN: ICD-10-CM

## 2025-08-07 PROCEDURE — 3008F BODY MASS INDEX DOCD: CPT | Performed by: ANESTHESIOLOGY

## 2025-08-07 PROCEDURE — 1036F TOBACCO NON-USER: CPT | Performed by: ANESTHESIOLOGY

## 2025-08-07 PROCEDURE — 99214 OFFICE O/P EST MOD 30 MIN: CPT | Performed by: ANESTHESIOLOGY

## 2025-08-07 RX ORDER — OXYCODONE AND ACETAMINOPHEN 5; 325 MG/1; MG/1
1 TABLET ORAL EVERY 12 HOURS
Qty: 5 TABLET | Refills: 0 | Status: SHIPPED | OUTPATIENT
Start: 2025-08-07

## 2025-08-07 ASSESSMENT — PAIN DESCRIPTION - DESCRIPTORS: DESCRIPTORS: NUMBNESS;TINGLING;ACHING

## 2025-08-07 ASSESSMENT — ENCOUNTER SYMPTOMS
ALLERGIC/IMMUNOLOGIC NEGATIVE: 1
PSYCHIATRIC NEGATIVE: 1
RESPIRATORY NEGATIVE: 1
EYES NEGATIVE: 1
CONSTITUTIONAL NEGATIVE: 1
HEMATOLOGIC/LYMPHATIC NEGATIVE: 1
GASTROINTESTINAL NEGATIVE: 1
ENDOCRINE NEGATIVE: 1
CARDIOVASCULAR NEGATIVE: 1
BACK PAIN: 1
NEUROLOGICAL NEGATIVE: 1

## 2025-08-07 ASSESSMENT — PAIN - FUNCTIONAL ASSESSMENT: PAIN_FUNCTIONAL_ASSESSMENT: 0-10

## 2025-08-07 ASSESSMENT — PAIN SCALES - GENERAL
PAINLEVEL_OUTOF10: 4
PAINLEVEL_OUTOF10: 4

## 2025-08-07 NOTE — PROGRESS NOTES
Subjective   Patient ID: Fani Garcia is a 50 y.o. female who presents for Back Pain.  Back Pain    Patient here today for follow up today.  She reports she did well on her trip to [place].  She had to take some medication for the trip because of all of the daily walking.  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  Rest and sitting down to improve her back pain.  She has no radiation of the pain down into her legs at this time.  She is scheduled for a caudal LILY on Friday.  She reports her pain as a 4/10 today.  She does need a refill of her medications at this time.  She uses them only PRN which are helpful fro flares in pain.  She reports no numbness, tingling, or weakness at this time.          Review of Systems   Constitutional: Negative.    HENT: Negative.     Eyes: Negative.    Respiratory: Negative.     Cardiovascular: Negative.    Gastrointestinal: Negative.    Endocrine: Negative.    Genitourinary: Negative.    Musculoskeletal:  Positive for back pain.   Skin: Negative.    Allergic/Immunologic: Negative.    Neurological: Negative.    Hematological: Negative.    Psychiatric/Behavioral: Negative.         Objective   Physical Exam  Vitals and nursing note reviewed.   Constitutional:       Appearance: Normal appearance.   HENT:      Head: Normocephalic.     Neurological:      Mental Status: She is alert.         Assessment/Plan   Problem List Items Addressed This Visit           ICD-10-CM    Spondylosis without myelopathy or radiculopathy, lumbosacral region - Primary M47.817     Other Visit Diagnoses         Codes      Other chronic pain     G89.29          I nice discussion with the patient today our plan will be as follows.    Radiology: All available imaging was reviewed today.    Physically: Patient should continue home exercise program.    Psychologically: No issues at this time.    Medication: I will refill the patient's opioids today for 1 month.  The patient continues to see benefit and  improvement in their quality of life and ability to maintain ADLs.  Patient educated about the risks of taking opioids and operating a motor vehicle.  Patient reports no adverse side effects to current medication regimen.  Current regimen does allow patient to maintain ADLs.  Patient reports no new neurologic symptoms, new pain areas, or exacerbation in pain today.  Patient reports they are happy with current treatment care path.    [place] was reviewed and was consistent with the history.    Patient has been educated on the risks, benefits, and alternatives of controlled substances as well as the proper way to store these medications.  The patient and I discussed the nature of this medication and its side effects.  We discussed tolerance, physical dependence, psychological dependence, addiction and opioid-induced hyperalgesia.  We discussed the potential need to wean from this medication.  We discussed the availability of programs that can help with this process if necessary.  We discussed safety issues related to opioids including safe storage.  We discussed the fact that the patient should not drive an automobile or operate heavy machinery while taking this medication.  A prescription for naloxone was offered to the patient.  The patient will be re-evaluated for the need to continue opioid therapy in 60-90 days.  All previous urine drug screens and saliva drug screens were reviewed today and are compliant with the patient's prescriptive history.      Duration: Greater than 1 year.    Intervention:            Please note that this report has been produced using speech recognition software. It may contain errors related to grammar, punctuation or spelling. Electronically signed, but not reviewed.            Leander Pompa MD 08/07/25 2:53 PM

## 2025-08-07 NOTE — PROGRESS NOTES
MEDICATION NAME: percocet  STRENGTH: 5/325mg  LAST FILL DATE: 25  DATE LAST TAKEN: 25  QUANTITY FILLED: 60  QUANTITY REMAININ  COUNT COMPLETED BY: VALE RESENDEZ RN and L.WILKERSON, RN      UDS COMPLETED2025  CONTROLLED SUBSTANCES AGREEMENT SIGNED2024  ORT [id number]/2023  Modified [employer] disability form filled out annually.

## 2025-08-14 ENCOUNTER — APPOINTMENT (OUTPATIENT)
Dept: INTEGRATIVE MEDICINE | Facility: CLINIC | Age: 50
End: 2025-08-14
Payer: COMMERCIAL

## 2025-08-15 ENCOUNTER — HOSPITAL ENCOUNTER (OUTPATIENT)
Dept: OPERATING ROOM | Facility: HOSPITAL | Age: 50
Discharge: HOME | End: 2025-08-15
Payer: COMMERCIAL

## 2025-08-15 VITALS
HEIGHT: 64 IN | OXYGEN SATURATION: 100 % | DIASTOLIC BLOOD PRESSURE: 75 MMHG | SYSTOLIC BLOOD PRESSURE: 130 MMHG | RESPIRATION RATE: 16 BRPM | WEIGHT: 182 LBS | TEMPERATURE: 97.9 F | BODY MASS INDEX: 31.07 KG/M2 | HEART RATE: 88 BPM

## 2025-08-15 DIAGNOSIS — M47.817 LUMBOSACRAL SPONDYLOSIS WITHOUT MYELOPATHY: ICD-10-CM

## 2025-08-15 PROCEDURE — 2500000004 HC RX 250 GENERAL PHARMACY W/ HCPCS (ALT 636 FOR OP/ED): Performed by: ANESTHESIOLOGY

## 2025-08-15 PROCEDURE — 3600000006 HC OR TIME - EACH INCREMENTAL 1 MINUTE - PROCEDURE LEVEL ONE

## 2025-08-15 PROCEDURE — 3600000001 HC OR TIME - INITIAL BASE CHARGE - PROCEDURE LEVEL ONE

## 2025-08-15 PROCEDURE — 7100000010 HC PHASE TWO TIME - EACH INCREMENTAL 1 MINUTE

## 2025-08-15 PROCEDURE — 7100000009 HC PHASE TWO TIME - INITIAL BASE CHARGE

## 2025-08-15 PROCEDURE — 64493 INJ PARAVERT F JNT L/S 1 LEV: CPT | Performed by: ANESTHESIOLOGY

## 2025-08-15 RX ORDER — BUPIVACAINE HYDROCHLORIDE 5 MG/ML
INJECTION, SOLUTION PERINEURAL AS NEEDED
Status: COMPLETED | OUTPATIENT
Start: 2025-08-15 | End: 2025-08-15

## 2025-08-15 RX ORDER — LIDOCAINE HYDROCHLORIDE 20 MG/ML
INJECTION, SOLUTION EPIDURAL; INFILTRATION; INTRACAUDAL; PERINEURAL AS NEEDED
Status: COMPLETED | OUTPATIENT
Start: 2025-08-15 | End: 2025-08-15

## 2025-08-15 RX ADMIN — BUPIVACAINE HYDROCHLORIDE 1 ML: 5 INJECTION, SOLUTION PERINEURAL at 13:28

## 2025-08-15 RX ADMIN — LIDOCAINE HYDROCHLORIDE 10 ML: 20 INJECTION, SOLUTION EPIDURAL; INFILTRATION; INTRACAUDAL; PERINEURAL at 13:28

## 2025-08-15 ASSESSMENT — PAIN - FUNCTIONAL ASSESSMENT
PAIN_FUNCTIONAL_ASSESSMENT: 0-10
PAIN_FUNCTIONAL_ASSESSMENT: 0-10

## 2025-08-15 ASSESSMENT — PAIN SCALES - GENERAL
PAINLEVEL_OUTOF10: 0 - NO PAIN
PAINLEVEL_OUTOF10: 5 - MODERATE PAIN

## 2025-08-15 ASSESSMENT — PAIN DESCRIPTION - DESCRIPTORS: DESCRIPTORS: BURNING;ACHING;DULL

## 2025-08-21 ENCOUNTER — APPOINTMENT (OUTPATIENT)
Dept: INTEGRATIVE MEDICINE | Facility: CLINIC | Age: 50
End: 2025-08-21
Payer: COMMERCIAL

## 2025-08-28 ENCOUNTER — APPOINTMENT (OUTPATIENT)
Dept: INTEGRATIVE MEDICINE | Facility: CLINIC | Age: 50
End: 2025-08-28
Payer: COMMERCIAL

## 2025-09-04 ENCOUNTER — TELEMEDICINE (OUTPATIENT)
Facility: CLINIC | Age: 50
End: 2025-09-04
Payer: COMMERCIAL

## 2025-09-04 ENCOUNTER — APPOINTMENT (OUTPATIENT)
Dept: INTEGRATIVE MEDICINE | Facility: CLINIC | Age: 50
End: 2025-09-04
Payer: COMMERCIAL

## 2025-09-04 DIAGNOSIS — M47.817 LUMBOSACRAL SPONDYLOSIS WITHOUT MYELOPATHY: Primary | ICD-10-CM

## 2025-09-04 PROCEDURE — 99213 OFFICE O/P EST LOW 20 MIN: CPT | Performed by: ANESTHESIOLOGY

## 2025-09-04 PROCEDURE — 1036F TOBACCO NON-USER: CPT | Performed by: ANESTHESIOLOGY

## 2025-09-04 ASSESSMENT — ENCOUNTER SYMPTOMS
RESPIRATORY NEGATIVE: 1
GASTROINTESTINAL NEGATIVE: 1
NEUROLOGICAL NEGATIVE: 1
EYES NEGATIVE: 1
ALLERGIC/IMMUNOLOGIC NEGATIVE: 1
CARDIOVASCULAR NEGATIVE: 1
PSYCHIATRIC NEGATIVE: 1
ENDOCRINE NEGATIVE: 1
CONSTITUTIONAL NEGATIVE: 1
BACK PAIN: 1
HEMATOLOGIC/LYMPHATIC NEGATIVE: 1

## 2025-09-04 ASSESSMENT — PATIENT HEALTH QUESTIONNAIRE - PHQ9
2. FEELING DOWN, DEPRESSED OR HOPELESS: NOT AT ALL
SUM OF ALL RESPONSES TO PHQ9 QUESTIONS 1 & 2: 0
1. LITTLE INTEREST OR PLEASURE IN DOING THINGS: NOT AT ALL

## 2025-09-04 ASSESSMENT — PAIN SCALES - GENERAL
PAINLEVEL_OUTOF10: 8
PAINLEVEL_OUTOF10: 8

## 2025-09-04 ASSESSMENT — PAIN - FUNCTIONAL ASSESSMENT: PAIN_FUNCTIONAL_ASSESSMENT: 0-10

## 2025-09-04 ASSESSMENT — PAIN DESCRIPTION - DESCRIPTORS: DESCRIPTORS: ACHING;BURNING

## 2025-09-11 ENCOUNTER — APPOINTMENT (OUTPATIENT)
Dept: INTEGRATIVE MEDICINE | Facility: CLINIC | Age: 50
End: 2025-09-11
Payer: COMMERCIAL

## 2025-09-26 ENCOUNTER — APPOINTMENT (OUTPATIENT)
Dept: INTEGRATIVE MEDICINE | Facility: CLINIC | Age: 50
End: 2025-09-26
Payer: COMMERCIAL

## 2025-10-03 ENCOUNTER — APPOINTMENT (OUTPATIENT)
Dept: INTEGRATIVE MEDICINE | Facility: CLINIC | Age: 50
End: 2025-10-03
Payer: COMMERCIAL

## 2025-10-10 ENCOUNTER — APPOINTMENT (OUTPATIENT)
Dept: INTEGRATIVE MEDICINE | Facility: CLINIC | Age: 50
End: 2025-10-10
Payer: COMMERCIAL

## 2025-10-17 ENCOUNTER — APPOINTMENT (OUTPATIENT)
Dept: INTEGRATIVE MEDICINE | Facility: CLINIC | Age: 50
End: 2025-10-17
Payer: COMMERCIAL

## 2026-04-16 ENCOUNTER — APPOINTMENT (OUTPATIENT)
Dept: OPHTHALMOLOGY | Facility: CLINIC | Age: 51
End: 2026-04-16
Payer: COMMERCIAL

## 2026-05-01 ENCOUNTER — APPOINTMENT (OUTPATIENT)
Dept: SLEEP MEDICINE | Facility: CLINIC | Age: 51
End: 2026-05-01
Payer: COMMERCIAL